# Patient Record
Sex: FEMALE | Race: WHITE | NOT HISPANIC OR LATINO | Employment: OTHER | URBAN - METROPOLITAN AREA
[De-identification: names, ages, dates, MRNs, and addresses within clinical notes are randomized per-mention and may not be internally consistent; named-entity substitution may affect disease eponyms.]

---

## 2017-04-03 ENCOUNTER — ANESTHESIA (OUTPATIENT)
Dept: PERIOP | Facility: HOSPITAL | Age: 67
End: 2017-04-03

## 2017-04-03 ENCOUNTER — HOSPITAL ENCOUNTER (OUTPATIENT)
Facility: HOSPITAL | Age: 67
Discharge: HOME | End: 2017-04-03
Attending: PLASTIC SURGERY | Admitting: PLASTIC SURGERY

## 2017-04-03 ENCOUNTER — ANESTHESIA EVENT (OUTPATIENT)
Dept: PERIOP | Facility: HOSPITAL | Age: 67
End: 2017-04-03

## 2017-04-03 VITALS
BODY MASS INDEX: 23.04 KG/M2 | HEIGHT: 68 IN | RESPIRATION RATE: 16 BRPM | OXYGEN SATURATION: 94 % | WEIGHT: 152 LBS | TEMPERATURE: 97.6 F | HEART RATE: 84 BPM | SYSTOLIC BLOOD PRESSURE: 135 MMHG | DIASTOLIC BLOOD PRESSURE: 86 MMHG

## 2017-04-03 LAB
BASOPHILS # BLD AUTO: 0.02 10*3/MM3 (ref 0–0.2)
BASOPHILS NFR BLD AUTO: 0.3 % (ref 0–1)
DEPRECATED RDW RBC AUTO: 46.9 FL (ref 37–54)
EOSINOPHIL # BLD AUTO: 0.08 10*3/MM3 (ref 0.1–0.3)
EOSINOPHIL NFR BLD AUTO: 1.4 % (ref 0–3)
ERYTHROCYTE [DISTWIDTH] IN BLOOD BY AUTOMATED COUNT: 14.4 % (ref 11.3–14.5)
HCT VFR BLD AUTO: 40 % (ref 34.5–44)
HGB BLD-MCNC: 12.7 G/DL (ref 11.5–15.5)
IMM GRANULOCYTES # BLD: 0.01 10*3/MM3 (ref 0–0.03)
IMM GRANULOCYTES NFR BLD: 0.2 % (ref 0–0.6)
LYMPHOCYTES # BLD AUTO: 1.51 10*3/MM3 (ref 0.6–4.8)
LYMPHOCYTES NFR BLD AUTO: 26.2 % (ref 24–44)
MCH RBC QN AUTO: 28.4 PG (ref 27–31)
MCHC RBC AUTO-ENTMCNC: 31.8 G/DL (ref 32–36)
MCV RBC AUTO: 89.5 FL (ref 80–99)
MONOCYTES # BLD AUTO: 0.5 10*3/MM3 (ref 0–1)
MONOCYTES NFR BLD AUTO: 8.7 % (ref 0–12)
NEUTROPHILS # BLD AUTO: 3.64 10*3/MM3 (ref 1.5–8.3)
NEUTROPHILS NFR BLD AUTO: 63.2 % (ref 41–71)
PLATELET # BLD AUTO: 209 10*3/MM3 (ref 150–450)
PMV BLD AUTO: 9.6 FL (ref 6–12)
RBC # BLD AUTO: 4.47 10*6/MM3 (ref 3.89–5.14)
WBC NRBC COR # BLD: 5.76 10*3/MM3 (ref 3.5–10.8)

## 2017-04-03 PROCEDURE — 25010000002 FENTANYL CITRATE (PF) 100 MCG/2ML SOLUTION: Performed by: NURSE ANESTHETIST, CERTIFIED REGISTERED

## 2017-04-03 PROCEDURE — 85025 COMPLETE CBC W/AUTO DIFF WBC: CPT | Performed by: ANESTHESIOLOGY

## 2017-04-03 PROCEDURE — 25010000002 ONDANSETRON PER 1 MG: Performed by: NURSE ANESTHETIST, CERTIFIED REGISTERED

## 2017-04-03 PROCEDURE — C1789 PROSTHESIS, BREAST, IMP: HCPCS | Performed by: PLASTIC SURGERY

## 2017-04-03 PROCEDURE — 25010000002 LEVOFLOXACIN PER 250 MG: Performed by: PLASTIC SURGERY

## 2017-04-03 PROCEDURE — 25010000002 HYDROMORPHONE PER 4 MG: Performed by: NURSE ANESTHETIST, CERTIFIED REGISTERED

## 2017-04-03 PROCEDURE — 25010000002 NEOSTIGMINE 10 MG/10ML SOLUTION: Performed by: NURSE ANESTHETIST, CERTIFIED REGISTERED

## 2017-04-03 PROCEDURE — 25010000002 DEXAMETHASONE PER 1 MG: Performed by: NURSE ANESTHETIST, CERTIFIED REGISTERED

## 2017-04-03 PROCEDURE — 25010000002 PROPOFOL 10 MG/ML EMULSION: Performed by: NURSE ANESTHETIST, CERTIFIED REGISTERED

## 2017-04-03 PROCEDURE — 25010000002 PROPOFOL 1000 MG/ML EMULSION: Performed by: NURSE ANESTHETIST, CERTIFIED REGISTERED

## 2017-04-03 PROCEDURE — 25010000002 PROMETHAZINE PER 50 MG: Performed by: PLASTIC SURGERY

## 2017-04-03 PROCEDURE — 25010000002 MIDAZOLAM PER 1 MG: Performed by: ANESTHESIOLOGY

## 2017-04-03 DEVICE — IMPLANTABLE DEVICE: Type: IMPLANTABLE DEVICE | Status: FUNCTIONAL

## 2017-04-03 RX ORDER — PROMETHAZINE HYDROCHLORIDE 25 MG/ML
12.5 INJECTION, SOLUTION INTRAMUSCULAR; INTRAVENOUS EVERY 6 HOURS PRN
Status: DISCONTINUED | OUTPATIENT
Start: 2017-04-03 | End: 2017-04-03 | Stop reason: HOSPADM

## 2017-04-03 RX ORDER — FENTANYL CITRATE 50 UG/ML
INJECTION, SOLUTION INTRAMUSCULAR; INTRAVENOUS AS NEEDED
Status: DISCONTINUED | OUTPATIENT
Start: 2017-04-03 | End: 2017-04-03 | Stop reason: SURG

## 2017-04-03 RX ORDER — HYDROCODONE BITARTRATE AND ACETAMINOPHEN 10; 325 MG/1; MG/1
1 TABLET ORAL EVERY 6 HOURS PRN
Status: DISCONTINUED | OUTPATIENT
Start: 2017-04-03 | End: 2017-04-03 | Stop reason: HOSPADM

## 2017-04-03 RX ORDER — PROMETHAZINE HYDROCHLORIDE 25 MG/ML
12.5 INJECTION, SOLUTION INTRAMUSCULAR; INTRAVENOUS ONCE AS NEEDED
Status: DISCONTINUED | OUTPATIENT
Start: 2017-04-03 | End: 2017-04-03 | Stop reason: HOSPADM

## 2017-04-03 RX ORDER — PROPOFOL 10 MG/ML
VIAL (ML) INTRAVENOUS AS NEEDED
Status: DISCONTINUED | OUTPATIENT
Start: 2017-04-03 | End: 2017-04-03 | Stop reason: SURG

## 2017-04-03 RX ORDER — LIDOCAINE HYDROCHLORIDE 10 MG/ML
INJECTION, SOLUTION EPIDURAL; INFILTRATION; INTRACAUDAL; PERINEURAL AS NEEDED
Status: DISCONTINUED | OUTPATIENT
Start: 2017-04-03 | End: 2017-04-03 | Stop reason: SURG

## 2017-04-03 RX ORDER — MAGNESIUM HYDROXIDE 1200 MG/15ML
LIQUID ORAL AS NEEDED
Status: DISCONTINUED | OUTPATIENT
Start: 2017-04-03 | End: 2017-04-03 | Stop reason: HOSPADM

## 2017-04-03 RX ORDER — GLYCOPYRROLATE 0.2 MG/ML
INJECTION INTRAMUSCULAR; INTRAVENOUS AS NEEDED
Status: DISCONTINUED | OUTPATIENT
Start: 2017-04-03 | End: 2017-04-03 | Stop reason: SURG

## 2017-04-03 RX ORDER — NEOSTIGMINE METHYLSULFATE 1 MG/ML
INJECTION, SOLUTION INTRAVENOUS AS NEEDED
Status: DISCONTINUED | OUTPATIENT
Start: 2017-04-03 | End: 2017-04-03 | Stop reason: SURG

## 2017-04-03 RX ORDER — DEXAMETHASONE SODIUM PHOSPHATE 10 MG/ML
INJECTION INTRAMUSCULAR; INTRAVENOUS AS NEEDED
Status: DISCONTINUED | OUTPATIENT
Start: 2017-04-03 | End: 2017-04-03 | Stop reason: SURG

## 2017-04-03 RX ORDER — SODIUM CHLORIDE 0.9 % (FLUSH) 0.9 %
1-10 SYRINGE (ML) INJECTION AS NEEDED
Status: DISCONTINUED | OUTPATIENT
Start: 2017-04-03 | End: 2017-04-03 | Stop reason: HOSPADM

## 2017-04-03 RX ORDER — LIDOCAINE HYDROCHLORIDE AND EPINEPHRINE 10; 10 MG/ML; UG/ML
INJECTION, SOLUTION INFILTRATION; PERINEURAL AS NEEDED
Status: DISCONTINUED | OUTPATIENT
Start: 2017-04-03 | End: 2017-04-03 | Stop reason: HOSPADM

## 2017-04-03 RX ORDER — LIDOCAINE HYDROCHLORIDE 10 MG/ML
0.5 INJECTION, SOLUTION EPIDURAL; INFILTRATION; INTRACAUDAL; PERINEURAL ONCE AS NEEDED
Status: COMPLETED | OUTPATIENT
Start: 2017-04-03 | End: 2017-04-03

## 2017-04-03 RX ORDER — FENTANYL CITRATE 50 UG/ML
50 INJECTION, SOLUTION INTRAMUSCULAR; INTRAVENOUS
Status: DISCONTINUED | OUTPATIENT
Start: 2017-04-03 | End: 2017-04-03 | Stop reason: HOSPADM

## 2017-04-03 RX ORDER — ROCURONIUM BROMIDE 10 MG/ML
INJECTION, SOLUTION INTRAVENOUS AS NEEDED
Status: DISCONTINUED | OUTPATIENT
Start: 2017-04-03 | End: 2017-04-03 | Stop reason: SURG

## 2017-04-03 RX ORDER — PROMETHAZINE HYDROCHLORIDE 25 MG/1
25 TABLET ORAL ONCE AS NEEDED
Status: DISCONTINUED | OUTPATIENT
Start: 2017-04-03 | End: 2017-04-03 | Stop reason: HOSPADM

## 2017-04-03 RX ORDER — FAMOTIDINE 20 MG/1
20 TABLET, FILM COATED ORAL
Status: DISCONTINUED | OUTPATIENT
Start: 2017-04-03 | End: 2017-04-03 | Stop reason: HOSPADM

## 2017-04-03 RX ORDER — PROMETHAZINE HYDROCHLORIDE 25 MG/1
25 SUPPOSITORY RECTAL ONCE AS NEEDED
Status: DISCONTINUED | OUTPATIENT
Start: 2017-04-03 | End: 2017-04-03 | Stop reason: HOSPADM

## 2017-04-03 RX ORDER — LEVOFLOXACIN 5 MG/ML
500 INJECTION, SOLUTION INTRAVENOUS ONCE
Status: COMPLETED | OUTPATIENT
Start: 2017-04-03 | End: 2017-04-03

## 2017-04-03 RX ORDER — ONDANSETRON 2 MG/ML
INJECTION INTRAMUSCULAR; INTRAVENOUS AS NEEDED
Status: DISCONTINUED | OUTPATIENT
Start: 2017-04-03 | End: 2017-04-03 | Stop reason: SURG

## 2017-04-03 RX ORDER — PROMETHAZINE HYDROCHLORIDE 12.5 MG/1
12.5 TABLET ORAL EVERY 6 HOURS PRN
Status: DISCONTINUED | OUTPATIENT
Start: 2017-04-03 | End: 2017-04-03 | Stop reason: HOSPADM

## 2017-04-03 RX ORDER — MIDAZOLAM HYDROCHLORIDE 1 MG/ML
2 INJECTION INTRAMUSCULAR; INTRAVENOUS ONCE
Status: COMPLETED | OUTPATIENT
Start: 2017-04-03 | End: 2017-04-03

## 2017-04-03 RX ORDER — FAMOTIDINE 10 MG/ML
20 INJECTION, SOLUTION INTRAVENOUS
Status: DISCONTINUED | OUTPATIENT
Start: 2017-04-03 | End: 2017-04-03

## 2017-04-03 RX ORDER — SODIUM CHLORIDE, SODIUM LACTATE, POTASSIUM CHLORIDE, CALCIUM CHLORIDE 600; 310; 30; 20 MG/100ML; MG/100ML; MG/100ML; MG/100ML
9 INJECTION, SOLUTION INTRAVENOUS CONTINUOUS PRN
Status: DISCONTINUED | OUTPATIENT
Start: 2017-04-03 | End: 2017-04-03 | Stop reason: HOSPADM

## 2017-04-03 RX ORDER — ONDANSETRON 2 MG/ML
4 INJECTION INTRAMUSCULAR; INTRAVENOUS ONCE AS NEEDED
Status: COMPLETED | OUTPATIENT
Start: 2017-04-03 | End: 2017-04-03

## 2017-04-03 RX ORDER — HYDROMORPHONE HYDROCHLORIDE 1 MG/ML
0.5 INJECTION, SOLUTION INTRAMUSCULAR; INTRAVENOUS; SUBCUTANEOUS
Status: DISCONTINUED | OUTPATIENT
Start: 2017-04-03 | End: 2017-04-03 | Stop reason: HOSPADM

## 2017-04-03 RX ADMIN — FAMOTIDINE 20 MG: 20 TABLET, FILM COATED ORAL at 08:35

## 2017-04-03 RX ADMIN — LEVOFLOXACIN 500 MG: 5 INJECTION, SOLUTION INTRAVENOUS at 10:10

## 2017-04-03 RX ADMIN — HYDROMORPHONE HYDROCHLORIDE 0.5 MG: 1 INJECTION, SOLUTION INTRAMUSCULAR; INTRAVENOUS; SUBCUTANEOUS at 12:26

## 2017-04-03 RX ADMIN — ONDANSETRON 4 MG: 2 INJECTION INTRAMUSCULAR; INTRAVENOUS at 11:03

## 2017-04-03 RX ADMIN — FENTANYL CITRATE 50 MCG: 50 INJECTION, SOLUTION INTRAMUSCULAR; INTRAVENOUS at 10:13

## 2017-04-03 RX ADMIN — MIDAZOLAM HYDROCHLORIDE 2 MG: 1 INJECTION, SOLUTION INTRAMUSCULAR; INTRAVENOUS at 09:02

## 2017-04-03 RX ADMIN — LIDOCAINE HYDROCHLORIDE 0.5 ML: 10 INJECTION, SOLUTION EPIDURAL; INFILTRATION; INTRACAUDAL; PERINEURAL at 08:39

## 2017-04-03 RX ADMIN — FENTANYL CITRATE 50 MCG: 50 INJECTION INTRAMUSCULAR; INTRAVENOUS at 12:33

## 2017-04-03 RX ADMIN — GLYCOPYRROLATE 0.4 MG: 0.2 INJECTION, SOLUTION INTRAMUSCULAR; INTRAVENOUS at 11:03

## 2017-04-03 RX ADMIN — PROPOFOL 200 MG: 10 INJECTION, EMULSION INTRAVENOUS at 10:13

## 2017-04-03 RX ADMIN — DEXAMETHASONE SODIUM PHOSPHATE 8 MG: 10 INJECTION INTRAMUSCULAR; INTRAVENOUS at 10:20

## 2017-04-03 RX ADMIN — PROPOFOL 25 MCG/KG/MIN: 10 INJECTION, EMULSION INTRAVENOUS at 10:13

## 2017-04-03 RX ADMIN — SODIUM CHLORIDE, POTASSIUM CHLORIDE, SODIUM LACTATE AND CALCIUM CHLORIDE 9 ML/HR: 600; 310; 30; 20 INJECTION, SOLUTION INTRAVENOUS at 08:40

## 2017-04-03 RX ADMIN — NEOSTIGMINE METHYLSULFATE 2.5 MG: 1 INJECTION, SOLUTION INTRAVENOUS at 11:03

## 2017-04-03 RX ADMIN — MUPIROCIN: 20 OINTMENT TOPICAL at 08:57

## 2017-04-03 RX ADMIN — PROMETHAZINE HYDROCHLORIDE 12.5 MG: 25 INJECTION, SOLUTION INTRAMUSCULAR; INTRAVENOUS at 15:09

## 2017-04-03 RX ADMIN — FENTANYL CITRATE 50 MCG: 50 INJECTION, SOLUTION INTRAMUSCULAR; INTRAVENOUS at 10:42

## 2017-04-03 RX ADMIN — ONDANSETRON 4 MG: 2 INJECTION INTRAMUSCULAR; INTRAVENOUS at 12:23

## 2017-04-03 RX ADMIN — LIDOCAINE HYDROCHLORIDE 50 MG: 10 INJECTION, SOLUTION EPIDURAL; INFILTRATION; INTRACAUDAL; PERINEURAL at 10:13

## 2017-04-03 RX ADMIN — ROCURONIUM BROMIDE 30 MG: 10 INJECTION INTRAVENOUS at 10:13

## 2017-04-03 NOTE — ANESTHESIA POSTPROCEDURE EVALUATION
Patient: Michelle Toure    Procedure Summary     Date Anesthesia Start Anesthesia Stop Room / Location    04/03/17 1010  [facility]       Procedure Diagnosis Surgeon Provider    PLACEMENT OF BREAST IMPLANT LEFT BREAST (Left Breast) No diagnosis on file. MD Rachid Mckeon MD          Anesthesia Type: general  Last vitals  BP  151/83   Temp   98.4   Pulse  85   Resp   12   SpO2   97     Post Anesthesia Care and Evaluation    Patient location during evaluation: PACU  Patient participation: complete - patient participated  Level of consciousness: awake and alert  Pain score: 0  Pain management: adequate  Airway patency: patent  Anesthetic complications: No anesthetic complications  PONV Status: none  Cardiovascular status: hemodynamically stable and acceptable  Respiratory status: nonlabored ventilation, acceptable and nasal cannula  Hydration status: acceptable

## 2017-04-03 NOTE — ANESTHESIA PROCEDURE NOTES
Airway  Urgency: elective    Airway not difficult    General Information and Staff    Patient location during procedure: OR  CRNA: ANNETTE SOTELO    Indications and Patient Condition  Indications for airway management: airway protection    Preoxygenated: yes  MILS not maintained throughout  Mask difficulty assessment: 1 - vent by mask    Final Airway Details  Final airway type: endotracheal airway      Successful airway: ETT  Cuffed: yes   Successful intubation technique: direct laryngoscopy  Endotracheal tube insertion site: oral  Blade: Lucinda  Blade size: #3  ETT size: 7.0 mm  Cormack-Lehane Classification: grade I - full view of glottis  Placement verified by: chest auscultation and capnometry   Measured from: lips  ETT to lips (cm): 20  Number of attempts at approach: 1    Additional Comments  Negative epigastric sounds, Breath sound equal bilaterally with symmetric chest rise and fall

## 2017-04-03 NOTE — OP NOTE
DATE OF PROCEDURE:  04/03/2017    SURGEON: Latonia Resendez MD     ASSISTANT: Seferino Paul PA-C     ANESTHESIA: General LMA.     PREOPERATIVE DIAGNOSES:  1. Breast cancer.   2. Status post bilateral mastectomies and multiple reconstructive procedures.   3. Recent rupture of left breast implant with explantation.     POSTOPERATIVE DIAGNOSES:  1. Breast cancer.   2. Status post bilateral mastectomies and multiple reconstructive procedures.   3. Recent rupture of left breast implant with explantation.     PROCEDURES PERFORMED: Left capsulotomy and placement of breast implant.     HISTORY OF PRESENT ILLNESS: The patient is a 66-year-old female who is several years status post bilateral mastectomies and staged reconstructive procedures. She underwent a procedure several months ago in which the implants were replaced due to capsular contractures and malposition of implants. She had done well, but then noticed a gradual folding of the implant below her incision. This was inspected in my office where the implant was found to be curiously ruptured with a 3 cm semicircular clean opening along the posterior aspect of the implant against the chest wall. The capsule was relatively undisturbed at that point in time. The implant and contents were removed at that time and sent to the company for inspection. The wound was closed, and she was scheduled to have the implant reinserted today.     DESCRIPTION OF PROCEDURE: The patient was taken to the operating room and placed on the table in the supine position. She underwent induction of general anesthesia which was maintained with an LMA throughout the procedure. Both breasts were prepped and draped in the usual sterile fashion per the old mastectomy scar, was marked and infiltrated with 0.5% lidocaine with epinephrine 1:20,000 with approximately 10 mL of the agent were used. The 4 cm incision utilized last week was reopened and extended laterally along the lines of the old  mastectomy scar for a distance of approximately 2 cm, making an approximately 6 cm opening. There was no residual free silicone detected. However, there was also no evidence of any diffusion of the silicone into the periprosthetic capsule. The pocket was irrigated copiously with bacitracin solution. The electrocautery was used to extend the capsule in all directions, particularly medially and superiorly. It was opened inferiorly and laterally for a very small distance. Hemostasis was achieved with electrocautery. Additional irrigation was carried out. An Allergan Natrelle textured silicone implant with a style 110 with a volume of 420 mL was then placed in the pocket without difficulty. The breasts were inspected and appeared symmetrical. This incision was then closed in 4 layers, with 2 capsular layers of 3-0 Monocryl, subcutaneous 3-0 Monocryl, and subcuticular 4-0 Monocryl. The area was cleansed and dressed with Skin Affix as well as Xeroform, and plain gauze. She was placed in a postsurgical compression garment. She was awakened, the LMA was removed and she was transferred to recovery in stable condition.     There were no complications. There was minimal blood loss. Sponge and needle counts were correct x2. There was no tissue sent to pathology. There were no drains placed.      MD CHUY Henderson/cr  DD: 04/03/2017 12:04:25  DT: 04/03/2017 15:09:44  Voice Rec. ID #[id number]  Voice Original ID #[id number]  Doc ID #[id number]  Rev. #0  cc:

## 2017-04-03 NOTE — H&P
Pre-Op H&P    Chief complaint: Breast cancer    HPI:    Patient is a 66 y.o.female presents with breast cancer and here today for placement of left breast silicone implant     Review of Systems:  General ROS: negative for chills, fever or skin lesions;  No changes since last office visit  Cardiovascular ROS: no chest pain or dyspnea on exertion  Respiratory ROS: no cough, shortness of breath, or wheezing    Allergies:   Allergies   Allergen Reactions   • Adriamycin [Doxorubicin] Palpitations     CAUSED TACHYCARDIA   • Amoxicillin Other (See Comments)     PT CAN'T REMEMBER   • Other Rash     CLAVULANATE POTASSIUM       Home Meds    Prescriptions Prior to Admission   Medication Sig Dispense Refill Last Dose   • acetaminophen (TYLENOL) 500 MG tablet Take 500 mg by mouth Every 6 (Six) Hours As Needed for mild pain (1-3) (1-2 TABS).   Past Month at Unknown time       PMH:   Past Medical History:   Diagnosis Date   • Arthritis    • Breast cancer    • PONV (postoperative nausea and vomiting)    • Wears glasses      PSH:    Past Surgical History:   Procedure Laterality Date   • BREAST CAPSULOTOMY, IMPLANT REVISION Bilateral 11/10/2016    Procedure: REVISION KELI BREAST RECONSTRUCTION WITH RIGHT CAPSULOTOMY, KELI IMPLANT EXCHANGE AND FAT GRAFTING ;  Surgeon: Latonia Resendez MD;  Location:  [facility] OR;  Service:    • BREAST LUMPECTOMY Right    • COLONOSCOPY     • COSMETIC SURGERY      B BREAST IMPANTS IN 1979, REMOVED AND REPLACED IN 2009   • FAT GRAFTING Bilateral 11/10/2016    Procedure: FAT GRAFTING;  Surgeon: Latonia Resendez MD;  Location:  [facility] OR;  Service:    • HYSTERECTOMY     • LASIK Bilateral    • PORTACATH PLACEMENT      REMOVED    • SKIN BIOPSY     • TOOTH EXTRACTION Right 2.5 weeks ago   • VENOUS ACCESS DEVICE (PORT) REMOVAL  2011       Immunization History:  Influenza: no  Pneumococcal: yes < 5 years  Tetanus: no    Social History:   Tobacco:   History   Smoking Status   • Never Smoker   Smokeless Tobacco   • Never  "Used      Alcohol:   History   Alcohol Use   • Yes     Comment: 1-2  TIMES A MONTH HAS WINE       Physical Exam:BP (!) 152/102 (BP Location: Left arm, Patient Position: Lying) Comment (BP Location): no bps or sticks on the right side  Pulse 82  Temp 97.9 °F (36.6 °C) (Tympanic)   Resp 16  Ht 68\" (172.7 cm)  Wt 152 lb (68.9 kg)  SpO2 98%  Breastfeeding? No  BMI 23.11 kg/m2    General Appearance:    Alert, cooperative, no distress, appears stated age   Head:    Normocephalic, without obvious abnormality, atraumatic   Lungs:     Clear to auscultation bilaterally, respirations unlabored    Heart:   Regular rate and rhythm, S1 and S2 normal, no murmur, rub    or gallop    Abdomen:    Soft, non-tender.  +bowel sounds   Breast Exam:    deferred   Genitalia:    deferred   Extremities:   Extremities normal, atraumatic, no cyanosis or edema   Skin:   Skin color, texture, turgor normal, no rashes or lesions   Neurologic:   Grossly intact   Results Review  I reviewed the patient's new clinical results.    Cancer Staging (if applicable)  Cancer Patient: __ yes __no __unknown; If yes, clinical stage T:__ N:__M:__, stage group or __N/A    Impression: Breast cancer    Plan: Placement of left breast silicone implant    Jacey Avila, FIDELIA 4/3/2017 9:09 AM  "

## 2017-04-03 NOTE — BRIEF OP NOTE
BREAST IMPLANT REVISION AND/OR REMOVAL  Procedure Note    Michelle Toure  4/3/2017    Pre-op Diagnosis:   Status post left mastectomy  Post-op Diagnosis:     Same    Procedure/CPT® Codes:      Procedure(s):  PLACEMENT OF BREAST IMPLANT LEFT BREAST    Surgeon(s):  Latonia Resendez MD    Anesthesia: General    Staff:   Circulator: Ariela Rolon RN  Scrub Person: Tez Francisco  Nursing Assistant: Vero Frey  Assistant: Seferino Paul PA-C    Estimated Blood Loss: negligible  Urine Voided: * No values recorded between 4/3/2017 10:10 AM and 4/3/2017 11:06 AM *    Specimens:                * No specimens in log *      Drains:   None       Findings: Absent left breast    Complications: none      Latonia Resendez MD     Date: 4/3/2017  Time: 11:06 AM

## 2017-06-14 ENCOUNTER — TRANSCRIBE ORDERS (OUTPATIENT)
Dept: ADMINISTRATIVE | Facility: HOSPITAL | Age: 67
End: 2017-06-14

## 2017-06-15 ENCOUNTER — TRANSCRIBE ORDERS (OUTPATIENT)
Dept: ADMINISTRATIVE | Facility: HOSPITAL | Age: 67
End: 2017-06-15

## 2017-06-15 DIAGNOSIS — T85.43XA BREAST IMPLANT RUPTURE, INITIAL ENCOUNTER: Primary | ICD-10-CM

## 2017-06-20 ENCOUNTER — HOSPITAL ENCOUNTER (OUTPATIENT)
Dept: MAMMOGRAPHY | Facility: HOSPITAL | Age: 67
Discharge: HOME | End: 2017-06-20
Attending: PLASTIC SURGERY | Admitting: PLASTIC SURGERY

## 2017-06-20 DIAGNOSIS — T85.43XA BREAST IMPLANT RUPTURE, INITIAL ENCOUNTER: ICD-10-CM

## 2017-06-20 PROCEDURE — G0204 DX MAMMO INCL CAD BI: HCPCS | Performed by: RADIOLOGY

## 2017-06-20 PROCEDURE — G0279 TOMOSYNTHESIS, MAMMO: HCPCS | Performed by: RADIOLOGY

## 2017-06-20 PROCEDURE — G0279 TOMOSYNTHESIS, MAMMO: HCPCS

## 2017-06-20 PROCEDURE — G0204 DX MAMMO INCL CAD BI: HCPCS

## 2017-11-08 ENCOUNTER — TRANSCRIBE ORDERS (OUTPATIENT)
Dept: ADMINISTRATIVE | Facility: HOSPITAL | Age: 67
End: 2017-11-08

## 2017-11-08 DIAGNOSIS — R92.8 ABNORMAL MAMMOGRAPHY: Primary | ICD-10-CM

## 2017-12-26 ENCOUNTER — HOSPITAL ENCOUNTER (OUTPATIENT)
Dept: ULTRASOUND IMAGING | Facility: HOSPITAL | Age: 67
Discharge: HOME | End: 2017-12-26

## 2017-12-26 ENCOUNTER — TRANSCRIBE ORDERS (OUTPATIENT)
Dept: MAMMOGRAPHY | Facility: HOSPITAL | Age: 67
End: 2017-12-26

## 2017-12-26 ENCOUNTER — APPOINTMENT (OUTPATIENT)
Dept: OTHER | Facility: HOSPITAL | Age: 67
End: 2017-12-26

## 2017-12-26 ENCOUNTER — HOSPITAL ENCOUNTER (OUTPATIENT)
Dept: MAMMOGRAPHY | Facility: HOSPITAL | Age: 67
Discharge: HOME | End: 2017-12-26
Admitting: INTERNAL MEDICINE

## 2017-12-26 DIAGNOSIS — R92.8 ABNORMAL MAMMOGRAPHY: ICD-10-CM

## 2017-12-26 DIAGNOSIS — R92.8 ABNORMAL MAMMOGRAM: Primary | ICD-10-CM

## 2017-12-26 PROCEDURE — G0279 TOMOSYNTHESIS, MAMMO: HCPCS | Performed by: RADIOLOGY

## 2017-12-26 PROCEDURE — G0204 DX MAMMO INCL CAD BI: HCPCS | Performed by: RADIOLOGY

## 2017-12-26 PROCEDURE — 76642 ULTRASOUND BREAST LIMITED: CPT | Performed by: RADIOLOGY

## 2017-12-26 PROCEDURE — 76642 ULTRASOUND BREAST LIMITED: CPT

## 2017-12-26 PROCEDURE — G0279 TOMOSYNTHESIS, MAMMO: HCPCS

## 2017-12-26 PROCEDURE — G0204 DX MAMMO INCL CAD BI: HCPCS

## 2018-06-28 ENCOUNTER — HOSPITAL ENCOUNTER (OUTPATIENT)
Dept: MAMMOGRAPHY | Facility: HOSPITAL | Age: 68
Discharge: HOME | End: 2018-06-28
Admitting: INTERNAL MEDICINE

## 2018-06-28 ENCOUNTER — TRANSCRIBE ORDERS (OUTPATIENT)
Dept: MAMMOGRAPHY | Facility: HOSPITAL | Age: 68
End: 2018-06-28

## 2018-06-28 DIAGNOSIS — R92.8 ABNORMAL MAMMOGRAM: ICD-10-CM

## 2018-06-28 DIAGNOSIS — R92.8 ABNORMAL MAMMOGRAM: Primary | ICD-10-CM

## 2018-06-28 PROCEDURE — 77066 DX MAMMO INCL CAD BI: CPT | Performed by: RADIOLOGY

## 2018-06-28 PROCEDURE — G0279 TOMOSYNTHESIS, MAMMO: HCPCS | Performed by: RADIOLOGY

## 2018-06-28 PROCEDURE — 77066 DX MAMMO INCL CAD BI: CPT

## 2018-06-28 PROCEDURE — G0279 TOMOSYNTHESIS, MAMMO: HCPCS

## 2018-12-31 ENCOUNTER — HOSPITAL ENCOUNTER (OUTPATIENT)
Dept: MAMMOGRAPHY | Facility: HOSPITAL | Age: 68
Discharge: HOME | End: 2018-12-31
Admitting: INTERNAL MEDICINE

## 2018-12-31 DIAGNOSIS — R92.8 ABNORMAL MAMMOGRAM: ICD-10-CM

## 2018-12-31 PROCEDURE — 77066 DX MAMMO INCL CAD BI: CPT

## 2018-12-31 PROCEDURE — 77066 DX MAMMO INCL CAD BI: CPT | Performed by: RADIOLOGY

## 2019-09-09 ENCOUNTER — OFFICE VISIT (OUTPATIENT)
Dept: CARDIOLOGY | Facility: CLINIC | Age: 69
End: 2019-09-09

## 2019-09-09 ENCOUNTER — LAB (OUTPATIENT)
Dept: LAB | Facility: HOSPITAL | Age: 69
End: 2019-09-09

## 2019-09-09 VITALS
HEIGHT: 68 IN | BODY MASS INDEX: 24.25 KG/M2 | HEART RATE: 73 BPM | SYSTOLIC BLOOD PRESSURE: 160 MMHG | WEIGHT: 160 LBS | OXYGEN SATURATION: 100 % | DIASTOLIC BLOOD PRESSURE: 100 MMHG

## 2019-09-09 DIAGNOSIS — Z00.00 HEALTHCARE MAINTENANCE: ICD-10-CM

## 2019-09-09 DIAGNOSIS — R06.02 SHORTNESS OF BREATH: ICD-10-CM

## 2019-09-09 DIAGNOSIS — R07.89 OTHER CHEST PAIN: ICD-10-CM

## 2019-09-09 DIAGNOSIS — R00.2 PALPITATIONS: ICD-10-CM

## 2019-09-09 DIAGNOSIS — R60.9 PERIPHERAL EDEMA: ICD-10-CM

## 2019-09-09 DIAGNOSIS — R07.89 OTHER CHEST PAIN: Primary | ICD-10-CM

## 2019-09-09 PROBLEM — E55.9 VITAMIN D DEFICIENCY: Status: ACTIVE | Noted: 2019-09-09

## 2019-09-09 PROBLEM — R60.0 PERIPHERAL EDEMA: Status: ACTIVE | Noted: 2019-09-09

## 2019-09-09 PROBLEM — E53.8 VITAMIN B12 DEFICIENCY: Status: ACTIVE | Noted: 2019-09-09

## 2019-09-09 LAB
CHOLEST SERPL-MCNC: 193 MG/DL (ref 0–200)
HDLC SERPL-MCNC: 70 MG/DL (ref 40–60)
LDLC SERPL CALC-MCNC: 101 MG/DL (ref 0–100)
LDLC/HDLC SERPL: 1.45 {RATIO}
MAGNESIUM SERPL-MCNC: 2.4 MG/DL (ref 1.6–2.4)
TRIGL SERPL-MCNC: 109 MG/DL (ref 0–150)
TSH SERPL DL<=0.05 MIU/L-ACNC: 1.64 UIU/ML (ref 0.27–4.2)
VLDLC SERPL-MCNC: 21.8 MG/DL

## 2019-09-09 PROCEDURE — 36415 COLL VENOUS BLD VENIPUNCTURE: CPT

## 2019-09-09 PROCEDURE — 83735 ASSAY OF MAGNESIUM: CPT | Performed by: NURSE PRACTITIONER

## 2019-09-09 PROCEDURE — 99204 OFFICE O/P NEW MOD 45 MIN: CPT | Performed by: NURSE PRACTITIONER

## 2019-09-09 PROCEDURE — 84443 ASSAY THYROID STIM HORMONE: CPT | Performed by: NURSE PRACTITIONER

## 2019-09-09 PROCEDURE — 80061 LIPID PANEL: CPT | Performed by: NURSE PRACTITIONER

## 2019-09-09 PROCEDURE — 93000 ELECTROCARDIOGRAM COMPLETE: CPT | Performed by: NURSE PRACTITIONER

## 2019-09-09 RX ORDER — LANOLIN ALCOHOL/MO/W.PET/CERES
1000 CREAM (GRAM) TOPICAL 3 TIMES WEEKLY
COMMUNITY

## 2019-09-09 RX ORDER — ASPIRIN 81 MG/1
81 TABLET ORAL DAILY
COMMUNITY
End: 2020-06-02

## 2019-09-09 NOTE — PROGRESS NOTES
Subjective   Michelle Toure is a 69 y.o. female     Chief Complaint   Patient presents with   • Establish Care   • Palpitations       HPI    Problem list:    1.  Chest pain  2.  Palpitations  3.  Shortness of breath  4.  Right breast cancer  with 4 chemo is an 33 radiations in the lumpectomy    Patient is a 69-year-old female who presents today to establish care.  She said in May and  she had a lot of what she describes as midsternal chest tightness/heaviness that did not radiate.  She says she noticed it mostly whenever she was under a lot of stress.  She says she was short of breath whenever it happened.  She says she is noticed more palpitations and fluttering also when she is anxious.  She says this does occur with the chest pain.  She said years ago she had palpitations which felt like her heart was skipping a beat and she would cough and it was subtle.  She says she has not had anything like that.  She denies any dizziness, presyncope, syncope, orthopnea or PND.  She says she has some swelling in her right lower extremity that does not always go down but is in her knee area.  She says that she typically does not have shortness of breath unless she is having a episode of chest discomfort.  She says she is been fatigued for quite some time.  She says she is never slept well since her  passed away from blood cancer 5 and half years ago.    Occupation: Housewife   She denies smoking however she will have an occasional beer, Brandie or wine; no illicit drugs  She rarely drinks a Pepsi or root beer; no coffee; rare glass of iced tea    Mom 82 -car accident, permanent pacemaker  Dad 51 -brain tumor  Sister 64 -blood clot/stroke  Brother 51 -diabetes, PVD, blind  Sister 61 alive-Parkinson's      Current Outpatient Medications   Medication Sig Dispense Refill   • aspirin 81 MG EC tablet Take 81 mg by mouth Daily. Pt takes occasionally     • Cholecalciferol (D3-1000)  1000 units capsule Take 1,000 Units by mouth Daily.     • Multiple Vitamins-Minerals (ICAPS AREDS FORMULA PO) Take  by mouth.     • vitamin B-12 (CYANOCOBALAMIN) 1000 MCG tablet Take 1,000 mcg by mouth Daily.       No current facility-administered medications for this visit.        ALLERGIES    Adriamycin [doxorubicin]; Amoxicillin; and Other    Past Medical History:   Diagnosis Date   • Arthritis    • Breast cancer (CMS/HCC)     right, with radiation and chemo. 2010   • Cataract    • DDD (degenerative disc disease), lumbar    • Dermatitis    • Drug therapy    • HTN (hypertension)    • Hx of radiation therapy 2011   • Macular degeneration    • PONV (postoperative nausea and vomiting)    • Vitamin B deficiency    • Vitamin D deficiency    • Wears glasses        Social History     Socioeconomic History   • Marital status:      Spouse name: Not on file   • Number of children: Not on file   • Years of education: Not on file   • Highest education level: Not on file   Tobacco Use   • Smoking status: Never Smoker   • Smokeless tobacco: Never Used   Substance and Sexual Activity   • Alcohol use: Yes     Comment: social wine/beer drinker, maybe 1 drink every month or so    • Drug use: No   • Sexual activity: Defer       Family History   Problem Relation Age of Onset   • Heart failure Mother    • Brain cancer Father    • Stroke Sister    • Diabetes Brother    • Parkinsonism Sister    • Breast cancer Neg Hx    • Ovarian cancer Neg Hx    • Endometrial cancer Neg Hx        Review of Systems   Constitutional: Positive for fatigue (For a long time, don't sleep well since  passed 5 1/2 yrs ago ). Negative for diaphoresis.   HENT: Positive for hearing loss. Negative for congestion, rhinorrhea and sore throat.    Eyes: Positive for visual disturbance (reading glasses and distance glasses PRN ).   Respiratory: Positive for chest tightness (In May/June, had a lot of increased stress, which results in chest tightness.  "Hasn't had any since then. ) and shortness of breath (w/ the chest tightness in May. None since then; with CP ).    Cardiovascular: Positive for chest pain (In May when she was under increased stress, she had chest discomfort. Denies any since then; midsternum tightness/heaviness; no rad; typically just when she stresses out ), palpitations (Notices plaps/flutters more when she gets anxious; with CP; years ago use to have where it felt like it was skipping, would cough and resolve, that does not happen much now ) and leg swelling (RLE edema, doesn't go down overnight ).   Gastrointestinal: Negative for abdominal pain, blood in stool, constipation, diarrhea, nausea and vomiting.   Endocrine: Negative for cold intolerance and heat intolerance.   Genitourinary: Positive for frequency. Negative for difficulty urinating, dysuria, hematuria and urgency.   Musculoskeletal: Positive for back pain (muscule aches in back, has DDD). Negative for arthralgias and neck pain.   Skin: Negative for rash and wound.   Allergic/Immunologic: Negative for environmental allergies and food allergies.   Neurological: Positive for numbness (neuropathy in toes from right foot ). Negative for dizziness, syncope, weakness, light-headedness and headaches.   Hematological: Bruises/bleeds easily (bruises).   Psychiatric/Behavioral: The patient is nervous/anxious (Most sx related to anxiety).        Objective   /100   Pulse 73   Ht 172.7 cm (68\")   Wt 72.6 kg (160 lb)   SpO2 100%   BMI 24.33 kg/m²   Vitals:    09/09/19 0915   BP: 160/100   Pulse: 73   SpO2: 100%   Weight: 72.6 kg (160 lb)   Height: 172.7 cm (68\")      Lab Results (most recent)     None        Physical Exam   Constitutional: She is oriented to person, place, and time. Vital signs are normal. She appears well-developed and well-nourished. She is active and cooperative.   HENT:   Head: Normocephalic.   Eyes: Lids are normal.   Neck: Normal carotid pulses, no hepatojugular " reflux and no JVD present. Carotid bruit is not present.   Cardiovascular: Normal rate, regular rhythm and normal heart sounds.   Pulses:       Radial pulses are 2+ on the right side, and 2+ on the left side.        Dorsalis pedis pulses are 2+ on the right side, and 2+ on the left side.        Posterior tibial pulses are 2+ on the right side, and 2+ on the left side.   No edema BLE.    Pulmonary/Chest: Effort normal and breath sounds normal.   Abdominal: Normal appearance and bowel sounds are normal.   Neurological: She is alert and oriented to person, place, and time.   Skin: Skin is warm, dry and intact.   Psychiatric: She has a normal mood and affect. Her speech is normal and behavior is normal. Judgment and thought content normal. Cognition and memory are normal.       Procedure     ECG 12 Lead  Date/Time: 9/9/2019 9:39 AM  Performed by: Ashlie Dos Santos APRN  Authorized by: Ashlie Dos Santos APRN   Comparison: not compared with previous ECG   Rhythm: sinus rhythm  Rate: normal  BPM: 68  QRS axis: normal    Clinical impression: normal ECG                 Assessment/Plan      Diagnosis Plan   1. Other chest pain  ECG 12 Lead    Stress Test With Myocardial Perfusion One Day    Adult Transthoracic Echo Complete W/ Cont if Necessary Per Protocol    Lipid Panel   2. Palpitations  ECG 12 Lead    Stress Test With Myocardial Perfusion One Day    Adult Transthoracic Echo Complete W/ Cont if Necessary Per Protocol    Cardiac Event Monitor    TSH    Magnesium   3. Shortness of breath  Stress Test With Myocardial Perfusion One Day    Adult Transthoracic Echo Complete W/ Cont if Necessary Per Protocol   4. Peripheral edema  Stress Test With Myocardial Perfusion One Day    Adult Transthoracic Echo Complete W/ Cont if Necessary Per Protocol   5. Healthcare maintenance  TSH    Magnesium    Lipid Panel       Return in about 12 weeks (around 12/2/2019).       Chest pain/palpitations/shortness of breath/peripheral edema-patient  have ischemia work-up, stress and echo.  She will wear an event monitor for 2 weeks.  She will get a TSH, magnesium and lipid.  She will continue her medication regimen for now including her aspirin.  Since she has had no episodes since June patient did not feel necessary to receive any nitro.  She will continue her medication regimen for now.  She will follow-up in 12 weeks or sooner if any changes.  I did educate patient on nitro use and advised if she has any further issues to call our office and we will send some in.      Patient did bring blood pressure readings from home and her blood pressures typically very good.  Her lowest was 115 systolic with her highest being 134.     Patient's Body mass index is 24.33 kg/m². BMI is within normal parameters. No follow-up required..      Electronically signed by:

## 2019-09-09 NOTE — PATIENT INSTRUCTIONS
Palpitations  Palpitations are feelings that your heartbeat is irregular or is faster than normal. It may feel like your heart is fluttering or skipping a beat. Palpitations are usually not a serious problem. They may be caused by many things, including smoking, caffeine, alcohol, stress, and certain medicines or drugs. Most causes of palpitations are not serious. However, some palpitations can be a sign of a serious problem. You may need further tests to rule out serious medical problems.  Follow these instructions at home:    Pay attention to any changes in your condition. Take these actions to help manage your symptoms:  Eating and drinking  · Avoid foods and drinks that may cause palpitations. These may include:  ? Caffeinated coffee, tea, soft drinks, diet pills, and energy drinks.  ? Chocolate.  ? Alcohol.  Lifestyle  · Take steps to reduce your stress and anxiety. Things that can help you relax include:  ? Yoga.  ? Mind-body activities, such as deep breathing, meditation, or using words and images to create positive thoughts (guided imagery).  ? Physical activity, such as swimming, jogging, or walking. Tell your health care provider if your palpitations increase with activity. If you have chest pain or shortness of breath with activity, do not continue the activity until you are seen by your health care provider.  ? Biofeedback. This is a method that helps you learn to use your mind to control things in your body, such as your heartbeat.  · Do not use drugs, including cocaine or ecstasy. Do not use marijuana.  · Get plenty of rest and sleep. Keep a regular bed time.  General instructions  · Take over-the-counter and prescription medicines only as told by your health care provider.  · Do not use any products that contain nicotine or tobacco, such as cigarettes and e-cigarettes. If you need help quitting, ask your health care provider.  · Keep all follow-up visits as told by your health care provider. This is  important. These may include visits for further testing if palpitations do not go away or get worse.  Contact a health care provider if you:  · Continue to have a fast or irregular heartbeat after 24 hours.  · Notice that your palpitations occur more often.  Get help right away if you:  · Have chest pain or shortness of breath.  · Have a severe headache.  · Feel dizzy or you faint.  Summary  · Palpitations are feelings that your heartbeat is irregular or is faster than normal. It may feel like your heart is fluttering or skipping a beat.  · Palpitations may be caused by many things, including smoking, caffeine, alcohol, stress, certain medicines, and drugs.  · Although most causes of palpitations are not serious, some causes can be a sign of a serious medical problem.  · Get help right away if you faint or have chest pain, shortness of breath, a severe headache, or dizziness.  This information is not intended to replace advice given to you by your health care provider. Make sure you discuss any questions you have with your health care provider.  Document Released: 12/15/2001 Document Revised: 01/30/2019 Document Reviewed: 01/30/2019  [employer] Interactive Patient Education © 2019 [employer]  Nonspecific Chest Pain  Chest pain can be caused by many different conditions. It can be caused by something serious that needs treatment right away. This includes:  · Heart attack.  · A tear in the body's main blood vessel.  · Redness and swelling (inflammation) around your heart.  · Blood clot in your lungs.  It can be caused by something that is not as serious. This includes:  · Heartburn.  · Anxiety or stress.  · Damage to bones or muscles in your chest.  · Lung infections.  See your doctor right away if you have chest pain. This is important.  Follow these instructions at home:  Medicines  · Take over-the-counter and prescription medicines only as told by your doctor.  · If you were prescribed an antibiotic medicine, take  it as told by your doctor. Do not stop taking the antibiotic even if you start to feel better.  Lifestyle    · Rest as told by your doctor.  · Do not use any products that contain nicotine or tobacco, such as cigarettes and e-cigarettes. If you need help quitting, ask your doctor.  · Do not drink alcohol.  · Make lifestyle changes as told by your doctor. These may include:  ? Getting regular exercise. Ask your doctor what activities are safe for you.  ? Eating a heart-healthy diet. A diet and nutrition specialist (dietitian) can help you to learn healthy eating options.  ? Staying at a healthy weight.  ? Managing diabetes, if needed.  ? Lowering your stress. Activities such as yoga and relaxation techniques can help.  General instructions  · Avoid any activities that cause you to have chest pain.  · Keep all follow-up visits as told by your doctor. This is important. You may need more testing if your chest pain does not go away.  Contact a doctor if:  · Your chest pain does not go away.  · You feel depressed.  · You have a fever.  Get help right away if:  · Your chest pain is worse.  · You have a cough that gets worse, or you cough up blood.  · You have very bad (severe) pain in your belly (abdomen).  · You pass out (faint).  · You have either of these for no clear reason:  ? Sudden chest discomfort.  ? Sudden discomfort in your arms, back, neck, or jaw.  · You have shortness of breath at any time.  · You suddenly start to sweat, or your skin gets clammy.  · You feel sick to your stomach (nauseous).  · You throw up (vomit).  · You suddenly feel lightheaded or dizzy.  · You feel very weak or tired.  · Your heart starts to beat fast, or it feels like it is skipping beats.  These symptoms may be an emergency. Do not wait to see if the symptoms will go away. Get medical help right away. Call your local emergency services (911 in the [place] Do not drive yourself to the hospital.  Summary  · Chest pain can be caused by  many different conditions. The cause may be serious and need treatment right away. If you have chest pain, see your doctor right away.  · Follow your doctor's instructions for taking medicines and making lifestyle changes. Keep all follow-up visits as told by your doctor. This includes visits for any further testing if your chest pain does not go away.  · Know what signs mean you should get medical help right away.  This information is not intended to replace advice given to you by your health care provider. Make sure you discuss any questions you have with your health care provider.  Document Released: 06/05/2009 Document Revised: 02/08/2019 Document Reviewed: 02/08/2019  [employer] Interactive Patient Education © 2019 [employer]

## 2019-09-10 ENCOUNTER — TELEPHONE (OUTPATIENT)
Dept: CARDIOLOGY | Facility: CLINIC | Age: 69
End: 2019-09-10

## 2019-09-10 NOTE — TELEPHONE ENCOUNTER
----- Message from FIDELIA Breaux sent at 9/10/2019  7:46 AM EDT -----  Please advise patient of labs.  Advise bad cholesterol, LDL just one point too high.

## 2019-09-11 NOTE — TELEPHONE ENCOUNTER
Courtney Oliveros, Mariaa Byers             Patient called back this AM returning your call. Please call cell at [phone]. Thank you

## 2019-09-11 NOTE — TELEPHONE ENCOUNTER
Informed pt of her lab results, she verbalized understanding.     Pt stated she's supposed to have an echo and stress and that no one has called. I informed her to give them until Monday, then to call if she hasn't heard from anyone, she verbalized understanding.

## 2019-09-26 ENCOUNTER — HOSPITAL ENCOUNTER (OUTPATIENT)
Dept: CARDIOLOGY | Facility: HOSPITAL | Age: 69
Discharge: HOME | End: 2019-09-26

## 2019-09-26 VITALS — WEIGHT: 160.05 LBS | HEIGHT: 68 IN | BODY MASS INDEX: 24.26 KG/M2

## 2019-09-26 DIAGNOSIS — R00.2 PALPITATIONS: ICD-10-CM

## 2019-09-26 DIAGNOSIS — R60.9 PERIPHERAL EDEMA: ICD-10-CM

## 2019-09-26 DIAGNOSIS — R07.89 OTHER CHEST PAIN: ICD-10-CM

## 2019-09-26 DIAGNOSIS — R06.02 SHORTNESS OF BREATH: ICD-10-CM

## 2019-09-26 PROCEDURE — 93306 TTE W/DOPPLER COMPLETE: CPT

## 2019-09-26 PROCEDURE — A9500 TC99M SESTAMIBI: HCPCS | Performed by: INTERNAL MEDICINE

## 2019-09-26 PROCEDURE — 78452 HT MUSCLE IMAGE SPECT MULT: CPT | Performed by: INTERNAL MEDICINE

## 2019-09-26 PROCEDURE — 93306 TTE W/DOPPLER COMPLETE: CPT | Performed by: INTERNAL MEDICINE

## 2019-09-26 PROCEDURE — 0 TECHNETIUM SESTAMIBI: Performed by: INTERNAL MEDICINE

## 2019-09-26 PROCEDURE — 93018 CV STRESS TEST I&R ONLY: CPT | Performed by: INTERNAL MEDICINE

## 2019-09-26 PROCEDURE — 93017 CV STRESS TEST TRACING ONLY: CPT

## 2019-09-26 PROCEDURE — 78452 HT MUSCLE IMAGE SPECT MULT: CPT

## 2019-09-26 RX ADMIN — TECHNETIUM TC 99M SESTAMIBI 1 DOSE: 1 INJECTION INTRAVENOUS at 08:36

## 2019-09-26 RX ADMIN — TECHNETIUM TC 99M SESTAMIBI 1 DOSE: 1 INJECTION INTRAVENOUS at 10:00

## 2019-09-29 LAB
BH CV NUCLEAR PRIOR STUDY: 3
BH CV STRESS BP STAGE 1: NORMAL
BH CV STRESS BP STAGE 2: NORMAL
BH CV STRESS DURATION MIN STAGE 1: 3
BH CV STRESS DURATION MIN STAGE 2: 3
BH CV STRESS DURATION SEC STAGE 1: 0
BH CV STRESS DURATION SEC STAGE 2: 0
BH CV STRESS GRADE STAGE 1: 10
BH CV STRESS GRADE STAGE 2: 12
BH CV STRESS HR STAGE 1: 109
BH CV STRESS HR STAGE 2: 121
BH CV STRESS METS STAGE 1: 5
BH CV STRESS METS STAGE 2: 7.5
BH CV STRESS PROTOCOL 1: NORMAL
BH CV STRESS RECOVERY BP: NORMAL MMHG
BH CV STRESS RECOVERY HR: 77 BPM
BH CV STRESS SPEED STAGE 1: 1.7
BH CV STRESS SPEED STAGE 2: 2.5
BH CV STRESS STAGE 1: 1
BH CV STRESS STAGE 2: 2
MAXIMAL PREDICTED HEART RATE: 150 BPM
PERCENT MAX PREDICTED HR: 80.67 %
STRESS BASELINE BP: NORMAL MMHG
STRESS BASELINE HR: 74 BPM
STRESS PERCENT HR: 95 %
STRESS POST ESTIMATED WORKLOAD: 7 METS
STRESS POST EXERCISE DUR MIN: 6 MIN
STRESS POST EXERCISE DUR SEC: 11 SEC
STRESS POST PEAK BP: NORMAL MMHG
STRESS POST PEAK HR: 121 BPM
STRESS TARGET HR: 128 BPM

## 2019-10-01 ENCOUNTER — TELEPHONE (OUTPATIENT)
Dept: CARDIOLOGY | Facility: CLINIC | Age: 69
End: 2019-10-01

## 2019-10-01 DIAGNOSIS — I71.20 THORACIC AORTIC ANEURYSM WITHOUT RUPTURE (HCC): Primary | ICD-10-CM

## 2019-10-01 NOTE — TELEPHONE ENCOUNTER
Stress:  1.  Adequate exercise capacity without symptoms.     2.  Physiologic heart rate and blood pressure responses.     3.  No EKG evidence of ischemia.  No exercise-induced dysrhythmia.     4.  No scintigraphic evidence of ischemia.     5.  Preserved post stress ejection fraction of 74% with no focal wall motion abnormalities.     6.  No evidence of transient ischemic dilation nor of increased lung uptake of radiopharmaceutical.        Waiting on echo results...

## 2019-10-02 ENCOUNTER — TELEPHONE (OUTPATIENT)
Dept: CARDIOLOGY | Facility: CLINIC | Age: 69
End: 2019-10-02

## 2019-10-02 LAB
BH CV ECHO MEAS - ACS: 1.9 CM
BH CV ECHO MEAS - AO MAX PG (FULL): 0.19 MMHG
BH CV ECHO MEAS - AO MAX PG: 4.9 MMHG
BH CV ECHO MEAS - AO MEAN PG (FULL): 0.46 MMHG
BH CV ECHO MEAS - AO MEAN PG: 2.7 MMHG
BH CV ECHO MEAS - AO ROOT AREA (BSA CORRECTED): 2.2
BH CV ECHO MEAS - AO ROOT AREA: 12.6 CM^2
BH CV ECHO MEAS - AO ROOT DIAM: 4 CM
BH CV ECHO MEAS - AO V2 MAX: 111.1 CM/SEC
BH CV ECHO MEAS - AO V2 MEAN: 77.1 CM/SEC
BH CV ECHO MEAS - AO V2 VTI: 26.1 CM
BH CV ECHO MEAS - BSA(HAYCOCK): 1.9 M^2
BH CV ECHO MEAS - BSA: 1.9 M^2
BH CV ECHO MEAS - BZI_BMI: 24.3 KILOGRAMS/M^2
BH CV ECHO MEAS - BZI_METRIC_HEIGHT: 172.7 CM
BH CV ECHO MEAS - BZI_METRIC_WEIGHT: 72.6 KG
BH CV ECHO MEAS - CI(CUBED): 5.3 L/MIN/M^2
BH CV ECHO MEAS - CI(TEICH): 4.7 L/MIN/M^2
BH CV ECHO MEAS - CO(CUBED): 9.8 L/MIN
BH CV ECHO MEAS - CO(TEICH): 8.7 L/MIN
BH CV ECHO MEAS - EDV(CUBED): 90.8 ML
BH CV ECHO MEAS - EDV(TEICH): 92.1 ML
BH CV ECHO MEAS - EF(CUBED): 67 %
BH CV ECHO MEAS - EF(TEICH): 58.7 %
BH CV ECHO MEAS - ESV(CUBED): 29.9 ML
BH CV ECHO MEAS - ESV(TEICH): 38.1 ML
BH CV ECHO MEAS - FS: 30.9 %
BH CV ECHO MEAS - IVS/LVPW: 1.2
BH CV ECHO MEAS - IVSD: 1.2 CM
BH CV ECHO MEAS - LA DIMENSION: 2.2 CM
BH CV ECHO MEAS - LA/AO: 0.55
BH CV ECHO MEAS - LAT PEAK E' VEL: 7 CM/SEC
BH CV ECHO MEAS - LV IVRT: 0.1 SEC
BH CV ECHO MEAS - LV MASS(C)D: 166.4 GRAMS
BH CV ECHO MEAS - LV MASS(C)DI: 89.5 GRAMS/M^2
BH CV ECHO MEAS - LV MAX PG: 4.7 MMHG
BH CV ECHO MEAS - LV MEAN PG: 2.3 MMHG
BH CV ECHO MEAS - LV V1 MAX: 108.9 CM/SEC
BH CV ECHO MEAS - LV V1 MEAN: 68.9 CM/SEC
BH CV ECHO MEAS - LV V1 VTI: 25.7 CM
BH CV ECHO MEAS - LVIDD: 4.5 CM
BH CV ECHO MEAS - LVIDS: 3.1 CM
BH CV ECHO MEAS - LVPWD: 0.97 CM
BH CV ECHO MEAS - MED PEAK E' VEL: 6 CM/SEC
BH CV ECHO MEAS - MITRAL HR: 123 BPM
BH CV ECHO MEAS - MITRAL R-R: 0.49 SEC
BH CV ECHO MEAS - MM HR: 160.6 BPM
BH CV ECHO MEAS - MM R-R INT: 0.37 SEC
BH CV ECHO MEAS - MV A MAX VEL: 84.7 CM/SEC
BH CV ECHO MEAS - MV DEC SLOPE: 352.9 CM/SEC^2
BH CV ECHO MEAS - MV DEC TIME: 0.17 SEC
BH CV ECHO MEAS - MV E MAX VEL: 59.9 CM/SEC
BH CV ECHO MEAS - MV E/A: 0.71
BH CV ECHO MEAS - MV MAX PG: 2.9 MMHG
BH CV ECHO MEAS - MV MEAN PG: 1.6 MMHG
BH CV ECHO MEAS - MV V2 MAX: 84.6 CM/SEC
BH CV ECHO MEAS - MV V2 MEAN: 61.5 CM/SEC
BH CV ECHO MEAS - MV V2 VTI: 30 CM
BH CV ECHO MEAS - PA MAX PG: 3.1 MMHG
BH CV ECHO MEAS - PA MEAN PG: 1.8 MMHG
BH CV ECHO MEAS - PA V2 MAX: 88.2 CM/SEC
BH CV ECHO MEAS - PA V2 MEAN: 64.7 CM/SEC
BH CV ECHO MEAS - PA V2 VTI: 19.3 CM
BH CV ECHO MEAS - PULM. HR: 201.6 BPM
BH CV ECHO MEAS - PULM. R-R: 0.3 SEC
BH CV ECHO MEAS - RAP SYSTOLE: 10 MMHG
BH CV ECHO MEAS - RVDD: 3.1 CM
BH CV ECHO MEAS - SI(AO): 176.8 ML/M^2
BH CV ECHO MEAS - SI(CUBED): 32.7 ML/M^2
BH CV ECHO MEAS - SI(TEICH): 29.1 ML/M^2
BH CV ECHO MEAS - SV(AO): 328.6 ML
BH CV ECHO MEAS - SV(CUBED): 60.8 ML
BH CV ECHO MEAS - SV(TEICH): 54.1 ML
BH CV ECHO MEASUREMENTS AVERAGE E/E' RATIO: 9.22
MAXIMAL PREDICTED HEART RATE: 150 BPM
STRESS TARGET HR: 128 BPM

## 2019-10-02 NOTE — TELEPHONE ENCOUNTER
Spoke to patient and let her know about results. She is okay to try the Atenolol and will monitor her BP and pulse at home. She will call or come back sooner as needed, otherwise she will keep follow up as scheduled on 12/2/19.  Rx sent to [facility].  [place] SERNA    Rec'd monitor results. Pt had 0 critical, 0 serious, and 8 stable events with a heart rate of 92.5 bpm.  Per Ashlie Dos Santos, pt needs to start Atenolol 25mg 1/2 tablet qd. L/M asking pt to call back about results.  [place] SERNA

## 2019-10-03 NOTE — TELEPHONE ENCOUNTER
Ashlie Dos Santos, Mariaa Puckett             Have patient get a CTA of chest with BMP prior for dilated ascending aorta, Thoracic aortic aneurysm.        Echo:  Estimated EF appears to be in the range of 56 - 60%.  The proximal aortic root appears mildly dilated at 4 cm immediately above the sinotubular junction.

## 2019-10-08 RX ORDER — ATENOLOL 25 MG/1
TABLET ORAL
Qty: 15 TABLET | Refills: 2 | Status: SHIPPED | OUTPATIENT
Start: 2019-10-08 | End: 2020-01-07 | Stop reason: SDUPTHER

## 2019-11-04 ENCOUNTER — TELEPHONE (OUTPATIENT)
Dept: CARDIOLOGY | Facility: CLINIC | Age: 69
End: 2019-11-04

## 2019-11-04 NOTE — TELEPHONE ENCOUNTER
----- Message from FIDELIA Breaux sent at 11/4/2019  5:03 PM EST -----  Call patient and find out if she has had a chest CT done anywhere else.  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  Does she still have port or did she ever have a port?  Has she ever been told she had an occluded vein before?  If she has had other CTs of the chest please request them so that I can compare results.  Also having any cardiac symptoms or still doing well?  CT showed diffuse plaque in heart.Thanks!.  Also forward to PCP

## 2019-11-05 NOTE — TELEPHONE ENCOUNTER
Per Ashlie, find out if she had a port or picc line, if so, she's good. The report was sent to PCP.     Pt states that she had a port.   Pt states that she's not really having any cardiac issues, just palps and tightness w/ anxiety. I reminded her of appt and she verbalized understanding.

## 2019-11-05 NOTE — TELEPHONE ENCOUNTER
Courtney Oliveros, Mariaa Byers             Patient is returning your call from yesterday. -;[facility]

## 2019-12-02 ENCOUNTER — OFFICE VISIT (OUTPATIENT)
Dept: CARDIOLOGY | Facility: CLINIC | Age: 69
End: 2019-12-02

## 2019-12-02 VITALS
BODY MASS INDEX: 24.58 KG/M2 | HEIGHT: 68 IN | OXYGEN SATURATION: 99 % | HEART RATE: 64 BPM | SYSTOLIC BLOOD PRESSURE: 162 MMHG | DIASTOLIC BLOOD PRESSURE: 99 MMHG | WEIGHT: 162.2 LBS

## 2019-12-02 DIAGNOSIS — R60.9 PERIPHERAL EDEMA: ICD-10-CM

## 2019-12-02 DIAGNOSIS — I49.3 PVC (PREMATURE VENTRICULAR CONTRACTION): ICD-10-CM

## 2019-12-02 DIAGNOSIS — R06.02 SHORTNESS OF BREATH: ICD-10-CM

## 2019-12-02 DIAGNOSIS — I49.1 PREMATURE ATRIAL CONTRACTION: ICD-10-CM

## 2019-12-02 DIAGNOSIS — I10 ESSENTIAL HYPERTENSION: ICD-10-CM

## 2019-12-02 DIAGNOSIS — R00.2 PALPITATIONS: Primary | ICD-10-CM

## 2019-12-02 PROCEDURE — 99213 OFFICE O/P EST LOW 20 MIN: CPT | Performed by: NURSE PRACTITIONER

## 2019-12-02 NOTE — PROGRESS NOTES
Subjective   Michelle Toure is a 69 y.o. female     Chief Complaint   Patient presents with   • Follow-up       HPI    Problem list:    1.  Chest pain  1.1 stress test 9/26/19-no evidence of ischemia, post-rest EF 74%  2.  Palpitations  2.1 event monitor 9/12-9/25/19-run of SVT, PACs and PVCs  3.  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  4.  Right breast cancer 2010 with 4 chemo is an 33 radiations in the lumpectomy    Patient is a 69-year-old female who presents today for follow-up on testing.  She denies any chest pain, pressure, dizziness, presyncope, syncope, orthopnea, PND or edema.  She says her palpitations have been much better since she has been on the atenolol.  She brought her blood pressure readings in and they are excellent.  She denies any shortness of breath with activity.    We went over stress, echo and event.    Current Outpatient Medications on File Prior to Visit   Medication Sig Dispense Refill   • aspirin 81 MG EC tablet Take 81 mg by mouth Daily. Pt takes occasionally     • atenolol (TENORMIN) 25 MG tablet Take 1/2 tablet by mouth daily 15 tablet 2   • Calcium-Vitamin D-Vitamin K (VIACTIV PO) Take  by mouth.     • Cholecalciferol (D3-1000) 1000 units capsule Take 1,000 Units by mouth Daily.     • vitamin B-12 (CYANOCOBALAMIN) 1000 MCG tablet Take 1,000 mcg by mouth Daily.     • [DISCONTINUED] Multiple Vitamins-Minerals (ICAPS AREDS FORMULA PO) Take  by mouth.       No current facility-administered medications on file prior to visit.        ALLERGIES    Adriamycin [doxorubicin]; Amoxicillin; and Other    Past Medical  History:   Diagnosis Date   • Arthritis    • Breast cancer (CMS/HCC)     right, with radiation and chemo. 2010   • Cataract    • DDD (degenerative disc disease), lumbar    • Dermatitis    • Drug therapy    • HTN (hypertension)    • Hx of radiation therapy 2011   • Macular degeneration    • PONV (postoperative nausea and vomiting)    • Vitamin B deficiency    • Vitamin D deficiency    • Wears glasses        Social History     Socioeconomic History   • Marital status:      Spouse name: Not on file   • Number of children: Not on file   • Years of education: Not on file   • Highest education level: Not on file   Tobacco Use   • Smoking status: Never Smoker   • Smokeless tobacco: Never Used   Substance and Sexual Activity   • Alcohol use: Yes     Comment: social wine/beer drinker, maybe 1 drink every month or so    • Drug use: No   • Sexual activity: Defer       Family History   Problem Relation Age of Onset   • Heart failure Mother    • Brain cancer Father    • Stroke Sister    • Diabetes Brother    • Parkinsonism Sister    • Breast cancer Neg Hx    • Ovarian cancer Neg Hx    • Endometrial cancer Neg Hx        Review of Systems   Constitutional: Negative for diaphoresis and fatigue.   HENT: Positive for hearing loss ([place]). Negative for congestion, rhinorrhea and sore throat.    Eyes: Positive for visual disturbance (reading glasses ).   Respiratory: Negative for chest tightness and shortness of breath.    Cardiovascular: Positive for palpitations (seldom ). Negative for chest pain (Denies CP ) and leg swelling.   Gastrointestinal: Negative for abdominal pain, blood in stool, constipation, diarrhea, nausea and vomiting.   Endocrine: Negative for cold intolerance and heat intolerance.   Genitourinary: Negative for difficulty urinating, dysuria, frequency, hematuria and urgency.   Musculoskeletal: Positive for arthralgias and back pain (if doing too much ). Negative for neck pain.   Skin: Negative for rash and  "wound.   Allergic/Immunologic: Positive for environmental allergies. Negative for food allergies (seasonal ).   Neurological: Positive for numbness (under right foot from chemo ) and headaches (very seldom ). Negative for dizziness, syncope, weakness and light-headedness.   Hematological: Does not bruise/bleed easily.   Psychiatric/Behavioral: Positive for sleep disturbance (Imrpoving, states she still wakes up periodically ).       Objective   /99   Pulse 64   Ht 172.7 cm (68\")   Wt 73.6 kg (162 lb 3.2 oz)   SpO2 99%   BMI 24.66 kg/m²   Vitals:    12/02/19 1031   BP: 162/99   Pulse: 64   SpO2: 99%   Weight: 73.6 kg (162 lb 3.2 oz)   Height: 172.7 cm (68\")      Lab Results (most recent)     None        Physical Exam   Constitutional: She is oriented to person, place, and time. Vital signs are normal. She appears well-developed and well-nourished. She is active and cooperative.   HENT:   Head: Normocephalic.   Right Ear: Decreased hearing is noted.   Left Ear: Decreased hearing is noted.   Eyes: Lids are normal.   Reading glasses    Neck: Normal carotid pulses, no hepatojugular reflux and no JVD present. Carotid bruit is not present.   Cardiovascular: Normal rate, regular rhythm and normal heart sounds.   Pulses:       Radial pulses are 2+ on the right side, and 2+ on the left side.        Dorsalis pedis pulses are 2+ on the right side, and 2+ on the left side.        Posterior tibial pulses are 2+ on the right side, and 2+ on the left side.   No edema BLE.   Pulmonary/Chest: Effort normal and breath sounds normal.   Abdominal: Normal appearance and bowel sounds are normal.   Neurological: She is alert and oriented to person, place, and time.   Skin: Skin is warm, dry and intact.   Psychiatric: She has a normal mood and affect. Her speech is normal and behavior is normal. Judgment and thought content normal. Cognition and memory are normal.       Procedure   Procedures         Assessment/Plan      " Diagnosis Plan   1. Palpitations     2. Shortness of breath     3. Peripheral edema     4. PVC (premature ventricular contraction)     5. Premature atrial contraction     6. Essential hypertension         Return in about 6 months (around 6/2/2020).  Palpitation/PVCs/PACs-patient will continue her atenolol.  Shortness of breath-stable.  Peripheral edema-resolved.  Hypertension-patient brought blood pressure readings from home and on the atenolol she is doing excellent.  She will continue her medication regimen.  She will follow-up in 6 months or sooner if any changes.         Patient's Body mass index is 24.66 kg/m². BMI is within normal parameters. No follow-up required..      Electronically signed by:

## 2019-12-02 NOTE — PATIENT INSTRUCTIONS
Palpitations  Palpitations are feelings that your heartbeat is irregular or is faster than normal. It may feel like your heart is fluttering or skipping a beat. Palpitations are usually not a serious problem. They may be caused by many things, including smoking, caffeine, alcohol, stress, and certain medicines or drugs. Most causes of palpitations are not serious. However, some palpitations can be a sign of a serious problem. You may need further tests to rule out serious medical problems.  Follow these instructions at home:         Pay attention to any changes in your condition. Take these actions to help manage your symptoms:  Eating and drinking  · Avoid foods and drinks that may cause palpitations. These may include:  ? Caffeinated coffee, tea, soft drinks, diet pills, and energy drinks.  ? Chocolate.  ? Alcohol.  Lifestyle  · Take steps to reduce your stress and anxiety. Things that can help you relax include:  ? Yoga.  ? Mind-body activities, such as deep breathing, meditation, or using words and images to create positive thoughts (guided imagery).  ? Physical activity, such as swimming, jogging, or walking. Tell your health care provider if your palpitations increase with activity. If you have chest pain or shortness of breath with activity, do not continue the activity until you are seen by your health care provider.  ? Biofeedback. This is a method that helps you learn to use your mind to control things in your body, such as your heartbeat.  · Do not use drugs, including cocaine or ecstasy. Do not use marijuana.  · Get plenty of rest and sleep. Keep a regular bed time.  General instructions  · Take over-the-counter and prescription medicines only as told by your health care provider.  · Do not use any products that contain nicotine or tobacco, such as cigarettes and e-cigarettes. If you need help quitting, ask your health care provider.  · Keep all follow-up visits as told by your health care provider. This  is important. These may include visits for further testing if palpitations do not go away or get worse.  Contact a health care provider if you:  · Continue to have a fast or irregular heartbeat after 24 hours.  · Notice that your palpitations occur more often.  Get help right away if you:  · Have chest pain or shortness of breath.  · Have a severe headache.  Feel dizzy or you f  Premature Ventricular Contraction    A premature ventricular contraction (PVC) is a common kind of irregular heartbeat (arrhythmia). These contractions are extra heartbeats that start in the ventricles of the heart and occur too early in the normal sequence. During the PVC, the heart's normal electrical pathway is not used, so the beat is shorter and less effective. In most cases, these contractions come and go and do not require treatment.  What are the causes?  Common causes of the condition include:  Smoking.  Drinking alcohol.  Certain medicines.  Some illegal drugs.  Stress.  Caffeine.  Certain medical conditions can also cause PVCs:  Heart failure.  Heart attack, or coronary artery disease.  Heart valve problems.  Changes in minerals in the blood (electrolytes).  Low blood oxygen levels or high carbon dioxide levels.  In many cases, the cause of this condition is not known.  What are the signs or symptoms?  The main symptom of this condition is fast or skipped heartbeats (palpitations). Other symptoms include:  Chest pain.  Shortness of breath.  Feeling tired.  Dizziness.  Difficulty exercising.  In some cases, there are no symptoms.  How is this diagnosed?  This condition may be diagnosed based on:  Your medical history.  A physical exam. During the exam, the health care provider will check for irregular heartbeats.  Tests, such as:  An ECG (electrocardiogram) to monitor the electrical activity of your heart.  Ambulatory cardiac monitor. This device records your heartbeats for 24 hours or more.  Stress tests to see how exercise  affects your heart rhythm and blood supply.  Echocardiogram. This test uses sound waves (ultrasound) to produce an image of your heart.  Electrophysiology study (EPS). This test checks for electrical problems in your heart.  How is this treated?  Treatment for this condition depends on any underlying conditions, the type of PVCs that you are having, and how much the symptoms are interfering with your daily life.  Possible treatments include:  Avoiding things that cause premature contractions (triggers). These include caffeine and alcohol.  Taking medicines if symptoms are severe or if the extra heartbeats are frequent.  Getting treatment for underlying conditions that cause PVCs.  Having an implantable cardioverter defibrillator (ICD), if you are at risk for a serious arrhythmia. The ICD is a small device that is inserted into your chest to monitor your heartbeat. When it senses an irregular heartbeat, it sends a shock to bring the heartbeat back to normal.  Having a procedure to destroy the portion of the heart tissue that sends out abnormal signals (catheter ablation).  In some cases, no treatment is required.  Follow these instructions at home:  Alcohol use    Do not drink alcohol if:  Your health care provider tells you not to drink.  You are pregnant, may be pregnant, or are planning to become pregnant.  Alcohol triggers your episodes.  If you drink alcohol, limit how much you have. You may drink:  0-1 drink a day for women.  0-2 drinks a day for men.  Be aware of how much alcohol is in your drink. In the [place] one drink equals one typical bottle of beer (12 oz), one-half glass of wine (5 oz), or one shot of hard liquor (1½ oz).  General instructions  Do not use any products that contain nicotine or tobacco, such as cigarettes and e-cigarettes. If you need help quitting, ask your health care provider.  Find healthy ways to manage stress. Avoid stressful situations when possible.  Exercise regularly. Ask your  health care provider what type of exercise is safe for you.  Try to get at least 7-9 hours of sleep each night, or as much as recommended by your health care provider.  If caffeine triggers episodes of PVC, do not eat, drink, or use anything with caffeine in it.  Do not use illegal drugs.  Take over-the-counter and prescription medicines only as told by your health care provider.  Keep all follow-up visits as told by your health care provider. This is important.  Contact a health care provider if you:  Feel palpitations.  Get help right away if you:  Have chest pain.  Have shortness of breath.  Have sweating for no reason.  Have nausea and vomiting.  Become light-headed or you faint.  Summary  A premature ventricular contraction (PVC) is a common kind of irregular heartbeat (arrhythmia).  In most cases, these contractions come and go and do not require treatment.  You may need to wear an ambulatory cardiac monitor. This records your heartbeats for 24 hours or more.  Treatment depends on any underlying conditions, the type of PVCs that you are having, and how much the symptoms are interfering with your daily life.  This information is not intended to replace advice given to you by your health care provider. Make sure you discuss any questions you have with your health care provider.  Document Released: 08/04/2005 Document Revised: 08/02/2019 Document Reviewed: 02/05/2019  [employer] Interactive Patient Education © 2019 [employer]  · shani.  Summary  · Palpitations are feelings that your heartbeat is irregular or is faster than normal. It may feel like your heart is fluttering or skipping a beat.  · Palpitations may be caused by many things, including smoking, caffeine, alcohol, stress, certain medicines, and drugs.  · Although most causes of palpitations are not serious, some causes can be a sign of a serious medical problem.  · Get help right away if you faint or have chest pain, shortness of breath, a severe  "headache, or dizziness.  This information is not intended to replace advice given to you by your health care provider. Make sure you discuss any questions you have with your health care provider.  Document Released: 12/15/2001 Document Revised: 01/30/2019 Document Reviewed: 01/30/2019  [email or web address] Interactive Patient Education © 2019 [email or web address]    Premature Atrial Contraction    A premature atrial contraction (PAC) is a kind of irregular heartbeat (arrhythmia). It happens when the heart beats too early and then pauses before beating again.  The heart has four areas, or chambers. Normally, electrical signals spread across the heart and make all the chambers beat together. During a PAC, the upper chambers of the heart (atria) beat too early, before they have had time to fill with blood. The heartbeat pauses afterward so the heart can fill with blood for the next beat.  Sometimes PAC can be a warning sign of another type of arrhythmia called atrial fibrillation. Atrial fibrillation may allow blood to pool in the atria and form clots. If a clot travels to the brain, it can cause a stroke.  What are the causes?  The cause of this condition is often unknown. Sometimes it is caused by heart disease or injury to the heart.  What increases the risk?  This condition is more likely to develop in children and in adults who are 50 years of age or older. Episodes may be triggered by:  · Caffeine.  · Alcohol.  · Tobacco use.  · Stimulant drugs.  · Some medicines or supplements.  · Stress.  · Heart disease.  What are the signs or symptoms?  Symptoms of this condition include:  · A feeling that your heart skipped a beat. The first heartbeat after the \"skipped\" beat may feel more forceful.  · A feeling that your heart is fluttering.  How is this diagnosed?  This condition is diagnosed based on:  · Your symptoms.  · A physical exam. Your health care provider may listen to your heart.  · Electrocardiogram (ECG). This is a test that " records the electrical impulses of the heart.  · Ambulatory cardiac monitor. This device records your heartbeats for 24 hours or more.  You may also have:  · An echocardiogram to check for any heart conditions. This is a type of imaging test that uses sound waves (ultrasound) to make images of your heart.  · Blood tests.  How is this treated?  Treatment depends on the frequency of your symptoms and other risk factors. Treatments may include:  · Medicines (beta-blockers).  · Catheter ablation. This is done to destroy the part of the heart tissue that sends abnormal signals.  In some cases, treatment may not be needed for this condition.  Follow these instructions at home:  Alcohol use  · Do not drink alcohol if:  ? Your health care provider tells you not to drink.  ? You are pregnant, may be pregnant, or are planning to become pregnant.  ? Alcohol triggers your episodes.  · If you drink alcohol, limit how much you have. You may drink:  ? 0-1 drink a day for women.  ? 0-2 drinks a day for men.  · Be aware of how much alcohol is in your drink. In the [place] one drink equals one typical bottle of beer (12 oz), one-half glass of wine (5 oz), or one shot of hard liquor (1½ oz).  General instructions  · Do not use any products that contain nicotine or tobacco, such as cigarettes and e-cigarettes. If you need help quitting, ask your health care provider.  · If caffeine triggers episodes, do not eat, drink, or use anything with caffeine in it.  · Exercise regularly. Ask your health care provider what type of exercise is safe for you.  · Find healthy ways to manage stress.  · Try to get at least 7-9 hours of sleep each night, or as much as recommended by your health care provider.  · Take over-the-counter and prescription medicines only as told by your health care provider.  · Keep all follow-up visits as told by your health care provider. This is important.  Contact a health care provider if:  · You feel your heart skipping  "beats.  · Your heart skips beats and you feel dizzy, light-headed, or very tired.  Get help right away if you have:  · Chest pain.  · Trouble breathing.  · Any symptoms of stroke. \"BE FAST\" is an easy way to remember the main warning signs of a stroke.  ? B - Balance. Signs are dizziness, sudden trouble walking, or loss of balance.  ? E - Eyes. Signs are trouble seeing or a sudden change in vision.  ? F - Face. Signs are sudden weakness or numbness of the face, or the face or eyelid drooping on one side.  ? A - Arms. Signs are weakness or numbness in an arm. This happens suddenly and usually on one side of the body.  ? S - Speech. Signs are sudden trouble speaking, slurred speech, or trouble understanding what people say.  ? T - Time. Time to call emergency services. Write down what time symptoms started.  · Other signs of stroke, such as:  ? A sudden, severe headache with no known cause.  ? Nausea or vomiting.  ? Seizure.  These symptoms may represent a serious problem that is an emergency. Do not wait to see if the symptoms will go away. Get medical help right away. Call your local emergency services (911 in the [place] Do not drive yourself to the hospital.  Summary  · A premature atrial contraction (PAC) is a kind of irregular heartbeat (arrhythmia). It happens when the heart beats too early and then pauses before beating again.  · Treatment depends on your symptoms and whether you have other underlying heart conditions.  · Contact a health care provider if your heart skips beats and you feel dizzy, light-headed, or very tired.  · In some cases, this condition may lead to a stroke. \"BE FAST\" is an easy way to remember the warning signs of stroke. Get help right away if you have any of the \"BE FAST\" signs.  This information is not intended to replace advice given to you by your health care provider. Make sure you discuss any questions you have with your health care provider.  Document Released: 08/21/2015 Document " Revised: 08/02/2019 Document Reviewed: 02/05/2019  [employer] Interactive Patient Education © 2019 [employer]

## 2019-12-09 ENCOUNTER — TRANSCRIBE ORDERS (OUTPATIENT)
Dept: ADMINISTRATIVE | Facility: HOSPITAL | Age: 69
End: 2019-12-09

## 2019-12-09 DIAGNOSIS — Z12.31 VISIT FOR SCREENING MAMMOGRAM: Primary | ICD-10-CM

## 2020-01-07 RX ORDER — ATENOLOL 25 MG/1
TABLET ORAL
Qty: 15 TABLET | Refills: 2 | Status: SHIPPED | OUTPATIENT
Start: 2020-01-07 | End: 2020-03-30

## 2020-03-18 ENCOUNTER — HOSPITAL ENCOUNTER (OUTPATIENT)
Dept: MAMMOGRAPHY | Facility: HOSPITAL | Age: 70
Discharge: HOME | End: 2020-03-18
Admitting: INTERNAL MEDICINE

## 2020-03-18 DIAGNOSIS — Z12.31 VISIT FOR SCREENING MAMMOGRAM: ICD-10-CM

## 2020-03-18 PROCEDURE — 77067 SCR MAMMO BI INCL CAD: CPT | Performed by: RADIOLOGY

## 2020-03-18 PROCEDURE — 77063 BREAST TOMOSYNTHESIS BI: CPT | Performed by: RADIOLOGY

## 2020-03-18 PROCEDURE — 77063 BREAST TOMOSYNTHESIS BI: CPT

## 2020-03-18 PROCEDURE — 77067 SCR MAMMO BI INCL CAD: CPT

## 2020-03-30 RX ORDER — ATENOLOL 25 MG/1
TABLET ORAL
Qty: 45 TABLET | Refills: 0 | Status: SHIPPED | OUTPATIENT
Start: 2020-03-30 | End: 2020-07-13

## 2020-05-04 ENCOUNTER — TELEPHONE (OUTPATIENT)
Dept: CARDIOLOGY | Facility: CLINIC | Age: 70
End: 2020-05-04

## 2020-05-04 NOTE — TELEPHONE ENCOUNTER
Cardiac clearance req was reviewed in office by FIDELIA Bermeo and faxed back. -;[facility]

## 2020-06-02 ENCOUNTER — OFFICE VISIT (OUTPATIENT)
Dept: CARDIOLOGY | Facility: CLINIC | Age: 70
End: 2020-06-02

## 2020-06-02 VITALS
TEMPERATURE: 97.3 F | BODY MASS INDEX: 23.52 KG/M2 | HEART RATE: 84 BPM | WEIGHT: 155.2 LBS | SYSTOLIC BLOOD PRESSURE: 141 MMHG | OXYGEN SATURATION: 98 % | DIASTOLIC BLOOD PRESSURE: 97 MMHG | HEIGHT: 68 IN

## 2020-06-02 DIAGNOSIS — I10 ESSENTIAL HYPERTENSION: Primary | ICD-10-CM

## 2020-06-02 DIAGNOSIS — R60.9 PERIPHERAL EDEMA: ICD-10-CM

## 2020-06-02 DIAGNOSIS — I49.3 PVC (PREMATURE VENTRICULAR CONTRACTION): ICD-10-CM

## 2020-06-02 DIAGNOSIS — R06.02 SHORTNESS OF BREATH: ICD-10-CM

## 2020-06-02 DIAGNOSIS — I49.1 PREMATURE ATRIAL CONTRACTION: ICD-10-CM

## 2020-06-02 PROCEDURE — 99213 OFFICE O/P EST LOW 20 MIN: CPT | Performed by: NURSE PRACTITIONER

## 2020-06-02 NOTE — PROGRESS NOTES
Subjective   Michelle Toure is a 69 y.o. female     Chief Complaint   Patient presents with   • Palpitations       HPI    Problem list:    1.  Chest pain  1.1 stress test 9/26/19-no evidence of ischemia, post-rest EF 74%  2.  Palpitations  2.1 event monitor 9/12-9/25/19-run of SVT, PACs and PVCs  3.  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  4.  Right breast cancer 2010 with 4 chemo is an 33 radiations in the lumpectomy    Patient is a 69-year-old female who presents today for follow-up.  Patient says she had gone to the ER after having 3 days of significant left upper quadrant pain.  She said then she had pain in her low back and bilaterally under her breast.  She says that she was found to have diverticula in the hospital and was started on morphine and antibiotics.  She has not had any further episodes since the one episode of sharp pain she had in her chest on the third day of her left upper quadrant pain.  She denies any palpitations, fluttering, dizziness, presyncope, syncope, orthopnea, PND or edema.  She denies any shortness of breath with activity.  She says she is doing excellent on the atenolol and her blood pressures are staying beautiful.    Current Outpatient Medications on File Prior to Visit   Medication Sig Dispense Refill   • atenolol (TENORMIN) 25 MG tablet Take 1/2 (one-half) tablet by mouth once daily 45 tablet 0   • Cholecalciferol (D3-1000) 1000 units capsule Take 1,000 Units by mouth Daily.     • vitamin B-12 (CYANOCOBALAMIN) 1000 MCG tablet Take 1,000 mcg by mouth 3 (Three) Times a Week.     • [DISCONTINUED] aspirin 81  MG EC tablet Take 81 mg by mouth Daily. Pt takes occasionally     • [DISCONTINUED] Calcium-Vitamin D-Vitamin K (VIACTIV PO) Take  by mouth.       No current facility-administered medications on file prior to visit.        ALLERGIES    Adriamycin [doxorubicin]; Amoxicillin; and Other    Past Medical History:   Diagnosis Date   • Arthritis    • Breast cancer (CMS/HCC)     right, with radiation and chemo. 2010   • Cataract    • DDD (degenerative disc disease), lumbar    • Dermatitis    • Diverticulitis    • Drug therapy    • HTN (hypertension)    • Hx of radiation therapy 2011   • Macular degeneration    • PONV (postoperative nausea and vomiting)    • Vitamin B deficiency    • Vitamin D deficiency    • Wears glasses        Social History     Socioeconomic History   • Marital status:      Spouse name: Not on file   • Number of children: Not on file   • Years of education: Not on file   • Highest education level: Not on file   Tobacco Use   • Smoking status: Never Smoker   • Smokeless tobacco: Never Used   Substance and Sexual Activity   • Alcohol use: Yes     Comment: social wine/beer drinker, maybe 1 drink every month or so    • Drug use: No   • Sexual activity: Defer       Family History   Problem Relation Age of Onset   • Heart failure Mother    • Brain cancer Father    • Stroke Sister    • Diabetes Brother    • Parkinsonism Sister    • Breast cancer Neg Hx    • Ovarian cancer Neg Hx    • Endometrial cancer Neg Hx        Review of Systems   Constitutional: Negative for fatigue and fever.   HENT: Positive for hearing loss ([place]). Negative for congestion, rhinorrhea and sneezing.    Eyes: Positive for visual disturbance (wears glasses PRN).   Respiratory: Negative for cough, chest tightness, shortness of breath and wheezing.    Cardiovascular: Positive for chest pain (sharp pain behind chest wall bilaterally, on 3rd day of having LUQ pain, diagnosed with Diverticula; none since having ABX). Negative for  "palpitations and leg swelling.   Gastrointestinal: Negative for abdominal pain, blood in stool, constipation, diarrhea, nausea and vomiting.   Endocrine: Negative for cold intolerance and heat intolerance.   Genitourinary: Positive for frequency (urinary frequency). Negative for difficulty urinating, hematuria and urgency.   Musculoskeletal: Positive for arthralgias (joints) and back pain (low back pain with diverticula ). Negative for neck pain and neck stiffness.   Skin: Negative for rash and wound.   Allergic/Immunologic: Negative for environmental allergies and food allergies.   Neurological: Negative for dizziness, syncope, weakness, light-headedness and numbness.   Hematological: Bruises/bleeds easily (bruises easily).   Psychiatric/Behavioral: Negative for agitation, confusion and sleep disturbance (denies waking up smothering/SOA). The patient is nervous/anxious (easily nervous).        Objective   /97 (BP Location: Left arm, Patient Position: Sitting)   Pulse 84   Temp 97.3 °F (36.3 °C)   Ht 172.7 cm (68\")   Wt 70.4 kg (155 lb 3.2 oz)   SpO2 98%   BMI 23.60 kg/m²   Vitals:    06/02/20 1107   BP: 141/97   BP Location: Left arm   Patient Position: Sitting   Pulse: 84   Temp: 97.3 °F (36.3 °C)   SpO2: 98%   Weight: 70.4 kg (155 lb 3.2 oz)   Height: 172.7 cm (68\")      Lab Results (most recent)     None        Physical Exam   Constitutional: She is oriented to person, place, and time. Vital signs are normal. She appears well-developed and well-nourished. She is active and cooperative.   HENT:   Head: Normocephalic.   Eyes: Lids are normal.   Neck: Normal carotid pulses, no hepatojugular reflux and no JVD present. Carotid bruit is not present.   Cardiovascular: Normal rate, regular rhythm and normal heart sounds.   Pulses:       Radial pulses are 2+ on the right side, and 2+ on the left side.        Dorsalis pedis pulses are 2+ on the right side, and 2+ on the left side.        Posterior tibial " pulses are 2+ on the right side, and 2+ on the left side.   No edema BLE.   Pulmonary/Chest: Effort normal and breath sounds normal.   Abdominal: Normal appearance and bowel sounds are normal.   Neurological: She is alert and oriented to person, place, and time.   Skin: Skin is warm, dry and intact.   Psychiatric: She has a normal mood and affect. Her speech is normal and behavior is normal. Judgment and thought content normal. Cognition and memory are normal.       Procedure   Procedures         Assessment/Plan      Diagnosis Plan   1. Essential hypertension     2. Premature atrial contraction     3. PVC (premature ventricular contraction)     4. Shortness of breath     5. Peripheral edema         Return in about 6 months (around 12/2/2020).  Hypertension-patient's doing very well on atenolol.  PACs/PVCs-patient denies any palpitations on atenolol.  Shortness of breath-resolved.  Peripheral edema-resolved.  She will continue her medication regimen.  She will follow-up in 6 months or sooner if any changes.  I did advise patient should she have any further episodes of chest discomfort contact our office and we will get her in for a follow-up as well as any nitroglycerin at that time.     Advance Care Planning   ACP discussion was held with the patient during this visit. Patient does not have an advance directive, information provided.     Patient's Body mass index is 23.6 kg/m². BMI is within normal parameters. No follow-up required..      Electronically signed by:

## 2020-06-02 NOTE — PATIENT INSTRUCTIONS
Advance Directive    Advance directives are legal documents that let you make choices ahead of time about your health care and medical treatment in case you become unable to communicate for yourself. Advance directives are a way for you to communicate your wishes to family, friends, and health care providers. This can help convey your decisions about end-of-life care if you become unable to communicate.  Discussing and writing advance directives should happen over time rather than all at once. Advance directives can be changed depending on your situation and what you want, even after you have signed the advance directives.  If you do not have an advance directive, some states assign family decision makers to act on your behalf based on how closely you are related to them. Each state has its own laws regarding advance directives. You may want to check with your health care provider, , or state representative about the laws in your state. There are different types of advance directives, such as:  · Medical power of .  · Living will.  · Do not resuscitate (DNR) or do not attempt resuscitation (DNAR) order.  Health care proxy and medical power of   A health care proxy, also called a health care agent, is a person who is appointed to make medical decisions for you in cases in which you are unable to make the decisions yourself. Generally, people choose someone they know well and trust to represent their preferences. Make sure to ask this person for an agreement to act as your proxy. A proxy may have to exercise judgment in the event of a medical decision for which your wishes are not known.  A medical power of  is a legal document that names your health care proxy. Depending on the laws in your state, after the document is written, it may also need to be:  · Signed.  · Notarized.  · Dated.  · Copied.  · Witnessed.  · Incorporated into your medical record.  You may also want to appoint  someone to manage your financial affairs in a situation in which you are unable to do so. This is called a durable power of  for finances. It is a separate legal document from the durable power of  for health care. You may choose the same person or someone different from your health care proxy to act as your agent in financial matters.  If you do not appoint a proxy, or if there is a concern that the proxy is not acting in your best interests, a court-appointed guardian may be designated to act on your behalf.  Living will  A living will is a set of instructions documenting your wishes about medical care when you cannot express them yourself. Health care providers should keep a copy of your living will in your medical record. You may want to give a copy to family members or friends. To alert caregivers in case of an emergency, you can place a card in your wallet to let them know that you have a living will and where they can find it. A living will is used if you become:  · Terminally ill.  · Incapacitated.  · Unable to communicate or make decisions.  Items to consider in your living will include:  · The use or non-use of life-sustaining equipment, such as dialysis machines and breathing machines (ventilators).  · A DNR or DNAR order, which is the instruction not to use cardiopulmonary resuscitation (CPR) if breathing or heartbeat stops.  · The use or non-use of tube feeding.  · Withholding of food and fluids.  · Comfort (palliative) care when the goal becomes comfort rather than a cure.  · Organ and tissue donation.  A living will does not give instructions for distributing your money and property if you should pass away. It is recommended that you seek the advice of a  when writing a will. Decisions about taxes, beneficiaries, and asset distribution will be legally binding. This process can relieve your family and friends of any concerns surrounding disputes or questions that may come up about  the distribution of your assets.  DNR or DNAR  A DNR or DNAR order is a request not to have CPR in the event that your heart stops beating or you stop breathing. If a DNR or DNAR order has not been made and shared, a health care provider will try to help any patient whose heart has stopped or who has stopped breathing. If you plan to have surgery, talk with your health care provider about how your DNR or DNAR order will be followed if problems occur.  Summary  · Advance directives are the legal documents that allow you to make choices ahead of time about your health care and medical treatment in case you become unable to communicate for yourself.  · The process of discussing and writing advance directives should happen over time. You can change the advance directives, even after you have signed them.  · Advance directives include DNR or DNAR orders, living barnes, and designating an agent as your medical power of .  This information is not intended to replace advice given to you by your health care provider. Make sure you discuss any questions you have with your health care provider.  Document Released: 03/26/2009 Document Revised: 01/22/2020 Document Reviewed: 11/06/2017  Elsevier Patient Education © 2020 [employer]

## 2020-07-13 RX ORDER — ATENOLOL 25 MG/1
TABLET ORAL
Qty: 45 TABLET | Refills: 0 | Status: SHIPPED | OUTPATIENT
Start: 2020-07-13 | End: 2020-10-19

## 2020-10-11 ENCOUNTER — APPOINTMENT (OUTPATIENT)
Dept: PREADMISSION TESTING | Facility: HOSPITAL | Age: 70
End: 2020-10-11

## 2020-10-11 VITALS — BODY MASS INDEX: 23.59 KG/M2 | WEIGHT: 155.65 LBS | HEIGHT: 68 IN

## 2020-10-11 LAB
ANION GAP SERPL CALCULATED.3IONS-SCNC: 8 MMOL/L (ref 5–15)
BUN SERPL-MCNC: 13 MG/DL (ref 8–23)
BUN/CREAT SERPL: 16 (ref 7–25)
CALCIUM SPEC-SCNC: 9.5 MG/DL (ref 8.6–10.5)
CHLORIDE SERPL-SCNC: 104 MMOL/L (ref 98–107)
CO2 SERPL-SCNC: 30 MMOL/L (ref 22–29)
CREAT SERPL-MCNC: 0.81 MG/DL (ref 0.57–1)
DEPRECATED RDW RBC AUTO: 44.4 FL (ref 37–54)
ERYTHROCYTE [DISTWIDTH] IN BLOOD BY AUTOMATED COUNT: 13.6 % (ref 12.3–15.4)
GFR SERPL CREATININE-BSD FRML MDRD: 70 ML/MIN/1.73
GLUCOSE SERPL-MCNC: 87 MG/DL (ref 65–99)
HCT VFR BLD AUTO: 41.9 % (ref 34–46.6)
HGB BLD-MCNC: 13.1 G/DL (ref 12–15.9)
MCH RBC QN AUTO: 27.8 PG (ref 26.6–33)
MCHC RBC AUTO-ENTMCNC: 31.3 G/DL (ref 31.5–35.7)
MCV RBC AUTO: 88.8 FL (ref 79–97)
PLATELET # BLD AUTO: 254 10*3/MM3 (ref 140–450)
PMV BLD AUTO: 9.5 FL (ref 6–12)
POTASSIUM SERPL-SCNC: 4.5 MMOL/L (ref 3.5–5.2)
RBC # BLD AUTO: 4.72 10*6/MM3 (ref 3.77–5.28)
SODIUM SERPL-SCNC: 142 MMOL/L (ref 136–145)
WBC # BLD AUTO: 6.65 10*3/MM3 (ref 3.4–10.8)

## 2020-10-11 PROCEDURE — 80048 BASIC METABOLIC PNL TOTAL CA: CPT | Performed by: PLASTIC SURGERY

## 2020-10-11 PROCEDURE — 93005 ELECTROCARDIOGRAM TRACING: CPT

## 2020-10-11 PROCEDURE — 36415 COLL VENOUS BLD VENIPUNCTURE: CPT

## 2020-10-11 PROCEDURE — U0004 COV-19 TEST NON-CDC HGH THRU: HCPCS | Performed by: INTERNAL MEDICINE

## 2020-10-11 PROCEDURE — C9803 HOPD COVID-19 SPEC COLLECT: HCPCS

## 2020-10-11 PROCEDURE — 85027 COMPLETE CBC AUTOMATED: CPT | Performed by: PLASTIC SURGERY

## 2020-10-11 PROCEDURE — 93010 ELECTROCARDIOGRAM REPORT: CPT | Performed by: INTERNAL MEDICINE

## 2020-10-11 NOTE — PAT
Patient to apply Chlorhexadine wipes  to surgical area (as instructed) the night before procedure and the AM of procedure. Wipes provided.    Patient instructed to drink 20 ounces (or until full) of Gatorade and it needs to be completed 1 hour before given arrival time for procedure (NO RED Gatorade)    Patient verbalized understanding.    Pt had Covid test today, just prior to PAT.

## 2020-10-12 LAB — SARS-COV-2 RNA RESP QL NAA+PROBE: NOT DETECTED

## 2020-10-13 ENCOUNTER — ANESTHESIA (OUTPATIENT)
Dept: PERIOP | Facility: HOSPITAL | Age: 70
End: 2020-10-13

## 2020-10-13 ENCOUNTER — ANESTHESIA EVENT (OUTPATIENT)
Dept: PERIOP | Facility: HOSPITAL | Age: 70
End: 2020-10-13

## 2020-10-13 ENCOUNTER — HOSPITAL ENCOUNTER (OUTPATIENT)
Facility: HOSPITAL | Age: 70
Setting detail: HOSPITAL OUTPATIENT SURGERY
Discharge: HOME | End: 2020-10-13
Attending: PLASTIC SURGERY | Admitting: PLASTIC SURGERY

## 2020-10-13 VITALS
HEIGHT: 68 IN | HEART RATE: 106 BPM | DIASTOLIC BLOOD PRESSURE: 102 MMHG | SYSTOLIC BLOOD PRESSURE: 161 MMHG | OXYGEN SATURATION: 100 % | BODY MASS INDEX: 23.59 KG/M2 | TEMPERATURE: 97.8 F | RESPIRATION RATE: 18 BRPM | WEIGHT: 155.64 LBS

## 2020-10-13 PROCEDURE — 25010000002 ONDANSETRON PER 1 MG: Performed by: NURSE ANESTHETIST, CERTIFIED REGISTERED

## 2020-10-13 PROCEDURE — 25010000002 FENTANYL CITRATE (PF) 100 MCG/2ML SOLUTION: Performed by: NURSE ANESTHETIST, CERTIFIED REGISTERED

## 2020-10-13 PROCEDURE — 25010000003 CEFAZOLIN IN DEXTROSE 2-4 GM/100ML-% SOLUTION: Performed by: PLASTIC SURGERY

## 2020-10-13 PROCEDURE — 25010000002 PROPOFOL 10 MG/ML EMULSION: Performed by: NURSE ANESTHETIST, CERTIFIED REGISTERED

## 2020-10-13 PROCEDURE — 25010000002 DEXAMETHASONE PER 1 MG: Performed by: NURSE ANESTHETIST, CERTIFIED REGISTERED

## 2020-10-13 RX ORDER — CEFAZOLIN SODIUM 2 G/100ML
2 INJECTION, SOLUTION INTRAVENOUS ONCE
Status: COMPLETED | OUTPATIENT
Start: 2020-10-13 | End: 2020-10-13

## 2020-10-13 RX ORDER — LIDOCAINE HYDROCHLORIDE 10 MG/ML
0.5 INJECTION, SOLUTION EPIDURAL; INFILTRATION; INTRACAUDAL; PERINEURAL ONCE AS NEEDED
Status: COMPLETED | OUTPATIENT
Start: 2020-10-13 | End: 2020-10-13

## 2020-10-13 RX ORDER — SODIUM CHLORIDE 0.9 % (FLUSH) 0.9 %
10 SYRINGE (ML) INJECTION AS NEEDED
Status: DISCONTINUED | OUTPATIENT
Start: 2020-10-13 | End: 2020-10-13 | Stop reason: HOSPADM

## 2020-10-13 RX ORDER — CEPHALEXIN 500 MG/1
500 CAPSULE ORAL 3 TIMES DAILY
Qty: 15 CAPSULE | Refills: 0 | Status: SHIPPED | OUTPATIENT
Start: 2020-10-13 | End: 2020-10-18

## 2020-10-13 RX ORDER — LIDOCAINE HYDROCHLORIDE 10 MG/ML
INJECTION, SOLUTION EPIDURAL; INFILTRATION; INTRACAUDAL; PERINEURAL AS NEEDED
Status: DISCONTINUED | OUTPATIENT
Start: 2020-10-13 | End: 2020-10-13 | Stop reason: SURG

## 2020-10-13 RX ORDER — HYDROMORPHONE HYDROCHLORIDE 1 MG/ML
0.5 INJECTION, SOLUTION INTRAMUSCULAR; INTRAVENOUS; SUBCUTANEOUS
Status: DISCONTINUED | OUTPATIENT
Start: 2020-10-13 | End: 2020-10-13 | Stop reason: HOSPADM

## 2020-10-13 RX ORDER — SODIUM CHLORIDE, SODIUM LACTATE, POTASSIUM CHLORIDE, CALCIUM CHLORIDE 600; 310; 30; 20 MG/100ML; MG/100ML; MG/100ML; MG/100ML
9 INJECTION, SOLUTION INTRAVENOUS CONTINUOUS
Status: DISCONTINUED | OUTPATIENT
Start: 2020-10-13 | End: 2020-10-13 | Stop reason: HOSPADM

## 2020-10-13 RX ORDER — GLYCOPYRROLATE 0.2 MG/ML
INJECTION INTRAMUSCULAR; INTRAVENOUS AS NEEDED
Status: DISCONTINUED | OUTPATIENT
Start: 2020-10-13 | End: 2020-10-13 | Stop reason: SURG

## 2020-10-13 RX ORDER — FAMOTIDINE 20 MG/1
20 TABLET, FILM COATED ORAL ONCE
Status: COMPLETED | OUTPATIENT
Start: 2020-10-13 | End: 2020-10-13

## 2020-10-13 RX ORDER — MAGNESIUM HYDROXIDE 1200 MG/15ML
LIQUID ORAL AS NEEDED
Status: DISCONTINUED | OUTPATIENT
Start: 2020-10-13 | End: 2020-10-13 | Stop reason: HOSPADM

## 2020-10-13 RX ORDER — FAMOTIDINE 10 MG/ML
20 INJECTION, SOLUTION INTRAVENOUS ONCE
Status: CANCELLED | OUTPATIENT
Start: 2020-10-13 | End: 2020-10-13

## 2020-10-13 RX ORDER — SODIUM CHLORIDE 0.9 % (FLUSH) 0.9 %
10 SYRINGE (ML) INJECTION EVERY 12 HOURS SCHEDULED
Status: DISCONTINUED | OUTPATIENT
Start: 2020-10-13 | End: 2020-10-13 | Stop reason: HOSPADM

## 2020-10-13 RX ORDER — PROPOFOL 10 MG/ML
VIAL (ML) INTRAVENOUS AS NEEDED
Status: DISCONTINUED | OUTPATIENT
Start: 2020-10-13 | End: 2020-10-13 | Stop reason: SURG

## 2020-10-13 RX ORDER — ONDANSETRON 2 MG/ML
4 INJECTION INTRAMUSCULAR; INTRAVENOUS ONCE AS NEEDED
Status: DISCONTINUED | OUTPATIENT
Start: 2020-10-13 | End: 2020-10-13 | Stop reason: HOSPADM

## 2020-10-13 RX ORDER — FENTANYL CITRATE 50 UG/ML
INJECTION, SOLUTION INTRAMUSCULAR; INTRAVENOUS AS NEEDED
Status: DISCONTINUED | OUTPATIENT
Start: 2020-10-13 | End: 2020-10-13 | Stop reason: SURG

## 2020-10-13 RX ORDER — DEXAMETHASONE SODIUM PHOSPHATE 4 MG/ML
INJECTION, SOLUTION INTRA-ARTICULAR; INTRALESIONAL; INTRAMUSCULAR; INTRAVENOUS; SOFT TISSUE AS NEEDED
Status: DISCONTINUED | OUTPATIENT
Start: 2020-10-13 | End: 2020-10-13 | Stop reason: SURG

## 2020-10-13 RX ORDER — FENTANYL CITRATE 50 UG/ML
50 INJECTION, SOLUTION INTRAMUSCULAR; INTRAVENOUS
Status: DISCONTINUED | OUTPATIENT
Start: 2020-10-13 | End: 2020-10-13 | Stop reason: HOSPADM

## 2020-10-13 RX ORDER — ONDANSETRON 2 MG/ML
INJECTION INTRAMUSCULAR; INTRAVENOUS AS NEEDED
Status: DISCONTINUED | OUTPATIENT
Start: 2020-10-13 | End: 2020-10-13 | Stop reason: SURG

## 2020-10-13 RX ADMIN — LIDOCAINE HYDROCHLORIDE 50 MG: 10 INJECTION, SOLUTION EPIDURAL; INFILTRATION; INTRACAUDAL; PERINEURAL at 13:14

## 2020-10-13 RX ADMIN — EPHEDRINE SULFATE 5 MG: 50 INJECTION INTRAMUSCULAR; INTRAVENOUS; SUBCUTANEOUS at 13:40

## 2020-10-13 RX ADMIN — FENTANYL CITRATE 50 MCG: 50 INJECTION, SOLUTION INTRAMUSCULAR; INTRAVENOUS at 13:52

## 2020-10-13 RX ADMIN — PROPOFOL 200 MG: 10 INJECTION, EMULSION INTRAVENOUS at 13:14

## 2020-10-13 RX ADMIN — FENTANYL CITRATE 50 MCG: 50 INJECTION, SOLUTION INTRAMUSCULAR; INTRAVENOUS at 13:14

## 2020-10-13 RX ADMIN — CEFAZOLIN SODIUM 2 G: 2 INJECTION, SOLUTION INTRAVENOUS at 13:11

## 2020-10-13 RX ADMIN — LIDOCAINE HYDROCHLORIDE 0.5 ML: 10 INJECTION, SOLUTION EPIDURAL; INFILTRATION; INTRACAUDAL; PERINEURAL at 12:13

## 2020-10-13 RX ADMIN — EPHEDRINE SULFATE 15 MG: 50 INJECTION INTRAMUSCULAR; INTRAVENOUS; SUBCUTANEOUS at 13:38

## 2020-10-13 RX ADMIN — FAMOTIDINE 20 MG: 20 TABLET, FILM COATED ORAL at 12:13

## 2020-10-13 RX ADMIN — EPHEDRINE SULFATE 10 MG: 50 INJECTION INTRAMUSCULAR; INTRAVENOUS; SUBCUTANEOUS at 13:29

## 2020-10-13 RX ADMIN — ONDANSETRON 4 MG: 2 INJECTION INTRAMUSCULAR; INTRAVENOUS at 13:14

## 2020-10-13 RX ADMIN — DEXAMETHASONE SODIUM PHOSPHATE 4 MG: 4 INJECTION, SOLUTION INTRAMUSCULAR; INTRAVENOUS at 13:14

## 2020-10-13 RX ADMIN — GLYCOPYRROLATE 0.2 MG: 0.2 INJECTION INTRAMUSCULAR; INTRAVENOUS at 13:47

## 2020-10-13 RX ADMIN — EPHEDRINE SULFATE 10 MG: 50 INJECTION INTRAMUSCULAR; INTRAVENOUS; SUBCUTANEOUS at 13:44

## 2020-10-13 RX ADMIN — SODIUM CHLORIDE, POTASSIUM CHLORIDE, SODIUM LACTATE AND CALCIUM CHLORIDE 9 ML/HR: 600; 310; 30; 20 INJECTION, SOLUTION INTRAVENOUS at 12:13

## 2020-10-13 RX ADMIN — EPHEDRINE SULFATE 10 MG: 50 INJECTION INTRAMUSCULAR; INTRAVENOUS; SUBCUTANEOUS at 13:30

## 2020-10-13 NOTE — ANESTHESIA PROCEDURE NOTES
Airway  Urgency: elective    Date/Time: 10/13/2020 1:14 PM  Airway not difficult    General Information and Staff    Patient location during procedure: OR  CRNA: Melecio De Los Santos CRNA    Indications and Patient Condition  Indications for airway management: airway protection    Preoxygenated: yes  Mask difficulty assessment: 0 - not attempted    Final Airway Details  Final airway type: supraglottic airway      Successful airway: I-gel  Size 3    Number of attempts at approach: 1  Assessment: lips, teeth, and gum same as pre-op    Additional Comments  LMA placed without difficulty, ventilation with assist, equal breath sounds and symmetric chest rise and fall

## 2020-10-13 NOTE — H&P
Pre-Op H&P  Michelle Toure  [id number]  1950        HPI:    Patient is a 70 y.o.female who presents today for a bilateral breast implant removal. She notes that she's had difficulty with the implants since they were placed in 2016 and she would like them removed. She denies breast pain or redness.     She denies personal history of coronary artery disease. Per her chart, she's had PACs and PVCs in the past, which are controlled with atenolol. She denies chest pain or shortness of breath.     Review of Systems:  General ROS: negative for chills, fever or skin lesions;  No changes since last office visit.  Neg for recent sick exposure  Cardiovascular ROS: no chest pain or dyspnea on exertion  Respiratory ROS: no cough, shortness of breath, or wheezing    Allergies:   Allergies   Allergen Reactions   • Adriamycin [Doxorubicin] Palpitations     CAUSED TACHYCARDIA   • Amoxicillin Diarrhea and Rash   • Other Rash     CLAVULANATE POTASSIUM       Home Meds:    No current facility-administered medications on file prior to encounter.      Current Outpatient Medications on File Prior to Encounter   Medication Sig Dispense Refill   • atenolol (TENORMIN) 25 MG tablet Take 1/2 (one-half) tablet by mouth once daily (Patient taking differently: Take 12.5 mg by mouth Daily. Take 1/2 (one-half) tablet by mouth once daily) 45 tablet 0   • Cholecalciferol (D3-1000) 1000 units capsule Take 1,000 Units by mouth Daily.     • vitamin B-12 (CYANOCOBALAMIN) 1000 MCG tablet Take 1,000 mcg by mouth 3 (Three) Times a Week.         PMH:   Past Medical History:   Diagnosis Date   • Arthritis    • Breast cancer (CMS/HCC)     right, with radiation and chemo. 2010   • Cataract    • DDD (degenerative disc disease), lumbar    • Dermatitis    • Diverticulitis    • Drug therapy    • HTN (hypertension)    • Hx of radiation therapy 2011   • Macular degeneration    • PONV (postoperative nausea and vomiting)    • Vitamin B deficiency    • Vitamin D  "deficiency    • Wears glasses      PSH:    Past Surgical History:   Procedure Laterality Date   • AUGMENTATION MAMMAPLASTY  1979    silicone   • AUGMENTATION MAMMAPLASTY  2009    saline   • AUGMENTATION MAMMAPLASTY  11/2016    silicone with fat grafting    • BREAST BIOPSY     • BREAST CAPSULOTOMY, IMPLANT REVISION Bilateral 11/10/2016    Procedure: REVISION KELI BREAST RECONSTRUCTION WITH RIGHT CAPSULOTOMY, KELI IMPLANT EXCHANGE AND FAT GRAFTING ;  Surgeon: Latonia Resendez MD;  Location:  [facility];  Service:    • BREAST IMPLANT SURGERY Left 4/3/2017    Procedure: PLACEMENT OF BREAST IMPLANT LEFT BREAST;  Surgeon: Latonia Resendez MD;  Location:  [facility];  Service:    • BREAST LUMPECTOMY Right    • COLONOSCOPY     • COLONOSCOPY W/ BIOPSIES     • COSMETIC SURGERY      B BREAST IMPANTS IN 1979, REMOVED AND REPLACED IN 2009   • FAT GRAFTING Bilateral 11/10/2016    Procedure: FAT GRAFTING;  Surgeon: Latonia Resendez MD;  Location:  [facility];  Service:    • HYSTERECTOMY     • LASIK Bilateral    • PORTACATH PLACEMENT      REMOVED    • SKIN BIOPSY     • TOOTH EXTRACTION Right 2.5 weeks ago   • VENOUS ACCESS DEVICE (PORT) REMOVAL  2011       Social History:   Tobacco:   Social History     Tobacco Use   Smoking Status Never Smoker   Smokeless Tobacco Never Used      Alcohol:     Social History     Substance and Sexual Activity   Alcohol Use Yes    Comment: social wine/beer drinker, maybe 1 drink every month or so        Vitals:           Pulse 79   Temp 97.7 °F (36.5 °C) (Tympanic)   Resp 18   Ht 172.7 cm (68\")   Wt 70.6 kg (155 lb 10.3 oz)   SpO2 100%   BMI 23.67 kg/m²     Physical Exam:  General Appearance:    Alert, cooperative, no distress, appears stated age   Head:    Normocephalic, without obvious abnormality, atraumatic   Lungs:     Clear to auscultation bilaterally, respirations unlabored    Heart:   Regular rate and rhythm, S1 and S2 normal, no murmur, rub    or gallop    Abdomen:    Soft, nontender   Breast Exam:   "  deferred   Genitalia:    deferred   Extremities:   Extremities normal, atraumatic, no cyanosis or edema   Skin:   Skin color, texture, turgor normal, no rashes or lesions   Neurologic:   Grossly intact   Results Review  LABS:  Lab Results   Component Value Date    WBC 6.65 10/11/2020    HGB 13.1 10/11/2020    HCT 41.9 10/11/2020    MCV 88.8 10/11/2020     10/11/2020    NEUTROABS 3.64 04/03/2017    GLUCOSE 87 10/11/2020    BUN 13 10/11/2020    CREATININE 0.81 10/11/2020    EGFRIFNONA 70 10/11/2020     10/11/2020    K 4.5 10/11/2020     10/11/2020    CO2 30.0 (H) 10/11/2020    MG 2.4 09/09/2019    CALCIUM 9.5 10/11/2020       RADIOLOGY:  Imaging Results (Last 72 Hours)     ** No results found for the last 72 hours. **          I reviewed the patient's new clinical results.    Impression:   1. Presence of bilateral breast implants    Plan:   1. Bilateral breast implant removal   Patient was seen in the office and risks and benefits were discussed. Please see office note for details.     KWAN Carter   10/13/2020   11:56 EDT

## 2020-10-13 NOTE — ANESTHESIA POSTPROCEDURE EVALUATION
Patient: Michelle Toure    Procedure Summary     Date: 10/13/20 Room / Location:  [facility] / [facility]    Anesthesia Start: 1311 Anesthesia Stop: 1409    Procedure: BREAST IMPLANT REMOVAL BILATERAL (Bilateral Breast) Diagnosis:     Surgeon: Albert Garces MD Provider: David Hull MD    Anesthesia Type: general ASA Status: 2          Anesthesia Type: general    Vitals  Vitals Value Taken Time   BP     Temp     Pulse     Resp     SpO2 98 % 10/13/20 1409           Post Anesthesia Care and Evaluation    Patient location during evaluation: PACU  Patient participation: complete - patient participated  Level of consciousness: awake and alert  Pain score: 0  Pain management: adequate  Airway patency: patent  Anesthetic complications: No anesthetic complications  PONV Status: none  Cardiovascular status: hemodynamically stable and acceptable  Respiratory status: nonlabored ventilation, acceptable and nasal cannula  Hydration status: acceptable

## 2020-10-13 NOTE — ANESTHESIA PREPROCEDURE EVALUATION
Anesthesia Evaluation     Patient summary reviewed and Nursing notes reviewed   NPO Solid Status: > 8 hours  NPO Liquid Status: > 8 hours           Airway   Mallampati: I  TM distance: >3 FB  Neck ROM: full  No difficulty expected  Dental - normal exam     Pulmonary     breath sounds clear to auscultation  Cardiovascular   Exercise tolerance: good (4-7 METS)    Rhythm: regular  Rate: normal        Neuro/Psych  GI/Hepatic/Renal/Endo      Musculoskeletal     Abdominal    Substance History      OB/GYN          Other                        Anesthesia Plan    ASA 2     general     intravenous induction     Anesthetic plan, all risks, benefits, and alternatives have been provided, discussed and informed consent has been obtained with: patient.

## 2020-10-13 NOTE — OP NOTE
DATE OF PROCEDURE:  10/13/20    PREOPERATIVE DIAGNOSES:  1. Textured breast implants.     POSTOPERATIVE DIAGNOSES:  1. Textured breast implants.   2. Ruptured left breast implant.     PROCEDURES PERFORMED:  1.  Removal of bilateral breast implants.    SURGEON: Albert Garces MD    ASSISTANT: Sima Ortez PA-C     Circulator: Yue Delaney RN; Marlen Clifford RN  Scrub Person: Jefry Erica M  Assistant: Sima Ortez PA     Assistant: Sima Ortez PA  was responsible for performing the following activities: Suturing and their skilled assistance was necessary for the success of this case.      ANESTHESIA: General.    INDICATIONS FOR PROCEDURE: The patient is a 70-year-old female who had undergone bilateral mastectomies and reconstruction. She had textured implants placed. She desired removal of her implants due to the concern of texturing causing ALCL. So she was brought today for removal. She understood all the risks and benefits of the procedure, including need for future procedures, placement of a drain and elected to proceed.    OPERATIVE FINDINGS: Leaking left silicone implant.    DESCRIPTION OF PROCEDURE: The patient was seen in preoperative holding, marked and then taken to the operating room by anesthesia. Informed consent was signed and on the chart. The patient was placed supine on the operating table and general anesthesia was induced. Once verified, the case was then turned over to the surgical service. The patient had her chest and abdomen prepped and draped in normal sterile fashion. A timeout was called and all parties were in agreement, including nursing and anesthesia. Preoperative antibiotics were given. We began on the right side, making the incision from the previous incision with a 15 blade scalpel. I dissected down, removed the right implant it was noted to be intact. At this point, copious antibiotic irrigation was used in the right breast pocket. The incision was then closed in layers  with a 2-0 Vicryl in Peter’s fascia, 3-0 Monocryl and buried deep dermal fashion and 4-0 Monocryl in a running subcuticular fashion. I then moved to the left side. Made an incision through her previous incision. Dissected down to the pocket and it was noted to be a leaking silicone implant contained within the capsule. All the silicone was removed and the implant shell. I then irrigated with Aricept hibiclens solution to remove all silicone. At this time the pocket was inspected and noted to have no silicone in her pocket. The incision was then closed identically. Skin Affix skin glue was placed on the incisions. She then had Kerlix fluffs and a surgical bra placed. She was awoken, extubated and taken to PACU in stable condition. All sponge, instrument counts are correct. I, Dr. Albert Garces, was present and scrubbed for the entire procedure.    SPECIMENS REMOVED: None.    ESTIMATED BLOOD LOSS: minimal    DRAINS PLACED: None.    COMPLICATIONS: None immediate.      Albert Garces MD  10/13/20  14:10 EDT

## 2020-10-19 RX ORDER — ATENOLOL 25 MG/1
TABLET ORAL
Qty: 45 TABLET | Refills: 0 | Status: SHIPPED | OUTPATIENT
Start: 2020-10-19 | End: 2021-01-19

## 2020-10-28 LAB
QT INTERVAL: 402 MS
QTC INTERVAL: 427 MS

## 2021-01-19 RX ORDER — ATENOLOL 25 MG/1
TABLET ORAL
Qty: 15 TABLET | Refills: 2 | Status: SHIPPED | OUTPATIENT
Start: 2021-01-19 | End: 2021-04-23

## 2021-02-03 ENCOUNTER — TRANSCRIBE ORDERS (OUTPATIENT)
Dept: ADMINISTRATIVE | Facility: HOSPITAL | Age: 71
End: 2021-02-03

## 2021-03-08 ENCOUNTER — TRANSCRIBE ORDERS (OUTPATIENT)
Dept: ADMINISTRATIVE | Facility: HOSPITAL | Age: 71
End: 2021-03-08

## 2021-03-08 DIAGNOSIS — Z12.31 VISIT FOR SCREENING MAMMOGRAM: Primary | ICD-10-CM

## 2021-03-31 ENCOUNTER — OFFICE VISIT (OUTPATIENT)
Dept: CARDIOLOGY | Facility: CLINIC | Age: 71
End: 2021-03-31

## 2021-03-31 VITALS
BODY MASS INDEX: 24.71 KG/M2 | DIASTOLIC BLOOD PRESSURE: 93 MMHG | HEART RATE: 74 BPM | WEIGHT: 163 LBS | OXYGEN SATURATION: 99 % | TEMPERATURE: 97.3 F | SYSTOLIC BLOOD PRESSURE: 162 MMHG | HEIGHT: 68 IN

## 2021-03-31 DIAGNOSIS — I10 ESSENTIAL HYPERTENSION: Primary | ICD-10-CM

## 2021-03-31 DIAGNOSIS — R60.9 PERIPHERAL EDEMA: ICD-10-CM

## 2021-03-31 DIAGNOSIS — R00.2 PALPITATIONS: ICD-10-CM

## 2021-03-31 DIAGNOSIS — I49.3 PVC (PREMATURE VENTRICULAR CONTRACTION): ICD-10-CM

## 2021-03-31 DIAGNOSIS — I49.1 PREMATURE ATRIAL CONTRACTION: ICD-10-CM

## 2021-03-31 DIAGNOSIS — R06.02 SHORTNESS OF BREATH: ICD-10-CM

## 2021-03-31 PROCEDURE — 99214 OFFICE O/P EST MOD 30 MIN: CPT | Performed by: NURSE PRACTITIONER

## 2021-03-31 RX ORDER — NAPROXEN SODIUM 220 MG
220 TABLET ORAL 2 TIMES DAILY PRN
COMMUNITY
End: 2021-09-30

## 2021-03-31 NOTE — PROGRESS NOTES
Subjective   Michelle Toure is a 70 y.o. female     Chief Complaint   Patient presents with   • Follow-up   • Hypertension       HPI    Problem list:    1.  Chest pain  1.1 stress test 9/26/19-no evidence of ischemia, post-rest EF 74%  2.  Palpitations  2.1 event monitor 9/12-9/25/19-run of SVT, PACs and PVCs  3.  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  4.  Right breast cancer 2010 with 4 chemo is an 33 radiations in the lumpectomy  5. Moderna vaccination 1/29/2021; 2/26/2021    Patient is a 70-year-old female who presents today for follow-up.  She denies any chest pain or pressure.  She says she does still occasionally have palpitations.  She says it is nothing really different than what she had had in the past and for the most part is doing very good.  She denies any dizziness, presyncope, syncope, orthopnea, PND or edema.  She denies any shortness of breath with activity.  She says since having her second Covid vaccine she has had some pain in the arm.  She will try Benadryl and if it does not resolve with Benadryl on its own then we will send her in a Medrol Dosepak.  Will call us Monday if she still having symptoms.  Patient recently had a breast implants removed due to the fact that they were textured which were the ones that are increased risk for breast cancer since she has a history of breast cancer when they went in the left one had actually been leaking.    Current Outpatient Medications on File Prior to Visit   Medication Sig Dispense Refill   • atenolol (TENORMIN) 25 MG tablet Take 1/2 (one-half) tablet by mouth once daily 15  tablet 2   • Cholecalciferol (D3-1000) 1000 units capsule Take 1,000 Units by mouth Daily.     • naproxen sodium (ALEVE) 220 MG tablet Take 220 mg by mouth 2 (Two) Times a Day As Needed.     • vitamin B-12 (CYANOCOBALAMIN) 1000 MCG tablet Take 1,000 mcg by mouth 3 (Three) Times a Week.     • Zinc 50 MG capsule Take 1 tablet by mouth Daily. Zinc with vitamin C       No current facility-administered medications on file prior to visit.       ALLERGIES    Adriamycin [doxorubicin], Amoxicillin, and Other    Past Medical History:   Diagnosis Date   • Arthritis    • Breast cancer (CMS/HCC)     right, with radiation and chemo. 2010   • Cataract    • COVID-19 vaccine administered 01/29/2021    2nd vaccine 02/26/2021 Moderna   • DDD (degenerative disc disease), lumbar    • Dermatitis    • Diverticulitis    • Drug therapy    • HTN (hypertension)    • Hx of radiation therapy 2011   • Macular degeneration    • PONV (postoperative nausea and vomiting)    • Vitamin B deficiency    • Vitamin D deficiency    • Wears glasses        Social History     Socioeconomic History   • Marital status:      Spouse name: Not on file   • Number of children: Not on file   • Years of education: Not on file   • Highest education level: Not on file   Tobacco Use   • Smoking status: Never Smoker   • Smokeless tobacco: Never Used   Substance and Sexual Activity   • Alcohol use: Yes     Comment: social wine/beer drinker, maybe 1 drink every month or so    • Drug use: No   • Sexual activity: Defer       Family History   Problem Relation Age of Onset   • Heart failure Mother    • Brain cancer Father    • Stroke Sister    • Diabetes Brother    • Parkinsonism Sister    • Breast cancer Neg Hx    • Ovarian cancer Neg Hx    • Endometrial cancer Neg Hx        Review of Systems   Constitutional: Negative for appetite change, chills, fatigue and fever.   HENT: Negative for congestion, rhinorrhea and sore throat.    Eyes: Positive for visual disturbance  "(reading glasses and for distance ).   Respiratory: Negative for cough, chest tightness, shortness of breath and wheezing.    Cardiovascular: Positive for palpitations (fluttering ). Negative for chest pain and leg swelling.   Gastrointestinal: Negative for abdominal pain, blood in stool, constipation, diarrhea, nausea and vomiting.   Endocrine: Negative for cold intolerance and heat intolerance.   Genitourinary: Positive for frequency (Pt states she drinks alot ). Negative for difficulty urinating, dysuria, hematuria and urgency.   Musculoskeletal: Positive for arthralgias and back pain (lower back depends on what she is doing and how much she does ). Negative for joint swelling, neck pain and neck stiffness.   Skin: Negative for color change, rash and wound.   Allergic/Immunologic: Negative for environmental allergies and food allergies.   Neurological: Positive for weakness (left arm since she had covid vaccine she has to becareful of what she is doing ) and numbness (right foot and under the toes from Chemo it comes and goes ). Negative for dizziness, light-headedness and headaches.   Hematological: Does not bruise/bleed easily.   Psychiatric/Behavioral: Positive for sleep disturbance (hard to stay asleep she wakes up alot durning the night ).       Objective   /93 (BP Location: Left arm)   Pulse 74   Temp 97.3 °F (36.3 °C)   Ht 172.7 cm (68\")   Wt 73.9 kg (163 lb)   SpO2 99%   BMI 24.78 kg/m²   Vitals:    03/31/21 1116   BP: 162/93   BP Location: Left arm   Pulse: 74   Temp: 97.3 °F (36.3 °C)   SpO2: 99%   Weight: 73.9 kg (163 lb)   Height: 172.7 cm (68\")      Lab Results (most recent)     None        Physical Exam  Vitals reviewed.   Constitutional:       General: She is awake.      Appearance: Normal appearance. She is well-developed, well-groomed and normal weight.   HENT:      Head: Normocephalic.   Eyes:      General: Lids are normal.   Neck:      Vascular: No carotid bruit, hepatojugular " reflux or JVD.   Cardiovascular:      Rate and Rhythm: Normal rate and regular rhythm.      Pulses:           Radial pulses are 2+ on the right side and 2+ on the left side.        Dorsalis pedis pulses are 2+ on the right side and 2+ on the left side.        Posterior tibial pulses are 2+ on the right side and 2+ on the left side.      Heart sounds: Normal heart sounds.   Pulmonary:      Effort: Pulmonary effort is normal.      Breath sounds: Normal breath sounds and air entry.   Abdominal:      General: Bowel sounds are normal.      Palpations: Abdomen is soft.   Musculoskeletal:      Left upper arm: Tenderness present.      Right lower leg: No edema.      Left lower leg: No edema.   Skin:     General: Skin is warm and dry.   Neurological:      Mental Status: She is alert and oriented to person, place, and time.   Psychiatric:         Attention and Perception: Attention and perception normal.         Mood and Affect: Mood and affect normal.         Speech: Speech normal.         Behavior: Behavior normal. Behavior is cooperative.         Thought Content: Thought content normal.         Cognition and Memory: Cognition normal.         Judgment: Judgment normal.         Procedure   Procedures         Assessment/Plan      Diagnosis Plan   1. Essential hypertension     2. Palpitations     3. Premature atrial contraction     4. PVC (premature ventricular contraction)     5. Shortness of breath     6. Peripheral edema         Return in about 6 months (around 9/30/2021).    Hypertension-patient's on atenolol and doing very well.  She actually brought her blood pressure readings from home and she is rarely in the 130s usually typically 120s or less systolic.  Palpitations/PACs/PVCs-doing well on beta-blocker.  Shortness of breath-resolved.  Peripheral edema-resolved.  She will continue her medication regimen.  Follow-up in 6 months or sooner if any changes.    If patient's left arm is still sore after taking Benadryl  through Monday then she will call and we will send in a Medrol Dosepak.       Michelle Toure  reports that she has never smoked. She has never used smokeless tobacco..     Patient's Body mass index is 24.78 kg/m². BMI is within normal range . Advance Care Planning   ACP discussion was declined by the patient. Patient does not have an advance directive, declines further assistance.  Electronically signed by:         No significant past surgical history

## 2021-03-31 NOTE — PATIENT INSTRUCTIONS
Advance Care Planning and Advance Directives     You make decisions on a daily basis - decisions about where you want to live, your career, your home, your life. Perhaps one of the most important decisions you face is your choice for future medical care. Take time to talk with your family and your healthcare team and start planning today.  Advance Care Planning is a process that can help you:  · Understand possible future healthcare decisions in light of your own experiences  · Reflect on those decision in light of your goals and values  · Discuss your decisions with those closest to you and the healthcare professionals that care for you  · Make a plan by creating a document that reflects your wishes    Surrogate Decision Maker  In the event of a medical emergency, which has left you unable to communicate or to make your own decisions, you would need someone to make decisions for you.  It is important to discuss your preferences for medical treatment with this person while you are in good health.     Qualities of a surrogate decision maker:  • Willing to take on this role and responsibility  • Knows what you want for future medical care  • Willing to follow your wishes even if they don't agree with them  • Able to make difficult medical decisions under stressful circumstances    Advance Directives  These are legal documents you can create that will guide your healthcare team and decision maker(s) when needed. These documents can be stored in the electronic medical record.    · Living Will - a legal document to guide your care if you have a terminal condition or a serious illness and are unable to communicate. States vary by statute in document names/types, but most forms may include one or more of the following:        -  Directions regarding life-prolonging treatments        -  Directions regarding artificially provided nutrition/hydration        -  Choosing a healthcare decision maker        -  Direction  regarding organ/tissue donation    · Durable Power of  for Healthcare - this document names an -in-fact to make medical decisions for you, but it may also allow this person to make personal and financial decisions for you. Please seek the advice of an  if you need this type of document.    **Advance Directives are not required and no one may discriminate against you if you do not sign one.    Medical Orders  Many states allow specific forms/orders signed by your physician to record your wishes for medical treatment in your current state of health. This form, signed in personal communication with your physician, addresses resuscitation and other medical interventions that you may or may not want.      For more information or to schedule a time with a [facility] Advance Care Planning Facilitator contact: [email or web address] or call [phone] and someone will contact you directly.

## 2021-04-12 ENCOUNTER — HOSPITAL ENCOUNTER (OUTPATIENT)
Dept: MAMMOGRAPHY | Facility: HOSPITAL | Age: 71
Discharge: HOME | End: 2021-04-12
Admitting: INTERNAL MEDICINE

## 2021-04-12 DIAGNOSIS — Z12.31 VISIT FOR SCREENING MAMMOGRAM: ICD-10-CM

## 2021-04-12 PROCEDURE — 77063 BREAST TOMOSYNTHESIS BI: CPT

## 2021-04-12 PROCEDURE — 77067 SCR MAMMO BI INCL CAD: CPT | Performed by: RADIOLOGY

## 2021-04-12 PROCEDURE — 77067 SCR MAMMO BI INCL CAD: CPT

## 2021-04-12 PROCEDURE — 77063 BREAST TOMOSYNTHESIS BI: CPT | Performed by: RADIOLOGY

## 2021-04-23 RX ORDER — ATENOLOL 25 MG/1
TABLET ORAL
Qty: 45 TABLET | Refills: 3 | Status: SHIPPED | OUTPATIENT
Start: 2021-04-23 | End: 2021-09-30 | Stop reason: SDUPTHER

## 2021-09-30 ENCOUNTER — OFFICE VISIT (OUTPATIENT)
Dept: CARDIOLOGY | Facility: CLINIC | Age: 71
End: 2021-09-30

## 2021-09-30 ENCOUNTER — TELEPHONE (OUTPATIENT)
Dept: CARDIOLOGY | Facility: CLINIC | Age: 71
End: 2021-09-30

## 2021-09-30 ENCOUNTER — LAB (OUTPATIENT)
Dept: CARDIOLOGY | Facility: CLINIC | Age: 71
End: 2021-09-30

## 2021-09-30 VITALS
SYSTOLIC BLOOD PRESSURE: 170 MMHG | HEART RATE: 61 BPM | WEIGHT: 158 LBS | TEMPERATURE: 98.8 F | DIASTOLIC BLOOD PRESSURE: 98 MMHG | HEIGHT: 68 IN | OXYGEN SATURATION: 99 % | BODY MASS INDEX: 23.95 KG/M2

## 2021-09-30 DIAGNOSIS — I49.1 PREMATURE ATRIAL CONTRACTION: ICD-10-CM

## 2021-09-30 DIAGNOSIS — I49.3 PVC (PREMATURE VENTRICULAR CONTRACTION): ICD-10-CM

## 2021-09-30 DIAGNOSIS — I10 ESSENTIAL HYPERTENSION: ICD-10-CM

## 2021-09-30 DIAGNOSIS — R60.9 PERIPHERAL EDEMA: ICD-10-CM

## 2021-09-30 DIAGNOSIS — R00.2 PALPITATIONS: ICD-10-CM

## 2021-09-30 DIAGNOSIS — R06.02 SHORTNESS OF BREATH: ICD-10-CM

## 2021-09-30 DIAGNOSIS — I10 ESSENTIAL HYPERTENSION: Primary | ICD-10-CM

## 2021-09-30 LAB
ALBUMIN SERPL-MCNC: 4.97 G/DL (ref 3.5–5.2)
ALBUMIN/GLOB SERPL: 2.6 G/DL
ALP SERPL-CCNC: 73 U/L (ref 39–117)
ALT SERPL W P-5'-P-CCNC: 12 U/L (ref 1–33)
ANION GAP SERPL CALCULATED.3IONS-SCNC: 14.5 MMOL/L (ref 5–15)
AST SERPL-CCNC: 16 U/L (ref 1–32)
BASOPHILS # BLD AUTO: 0.04 10*3/MM3 (ref 0–0.2)
BASOPHILS NFR BLD AUTO: 0.6 % (ref 0–1.5)
BILIRUB SERPL-MCNC: 0.5 MG/DL (ref 0–1.2)
BUN SERPL-MCNC: 14 MG/DL (ref 8–23)
BUN/CREAT SERPL: 17.9 (ref 7–25)
CALCIUM SPEC-SCNC: 9.7 MG/DL (ref 8.6–10.5)
CHLORIDE SERPL-SCNC: 102 MMOL/L (ref 98–107)
CHOLEST SERPL-MCNC: 209 MG/DL (ref 0–200)
CO2 SERPL-SCNC: 22.5 MMOL/L (ref 22–29)
CREAT SERPL-MCNC: 0.78 MG/DL (ref 0.57–1)
DEPRECATED RDW RBC AUTO: 42.8 FL (ref 37–54)
EOSINOPHIL # BLD AUTO: 0.06 10*3/MM3 (ref 0–0.4)
EOSINOPHIL NFR BLD AUTO: 0.9 % (ref 0.3–6.2)
ERYTHROCYTE [DISTWIDTH] IN BLOOD BY AUTOMATED COUNT: 13.2 % (ref 12.3–15.4)
GFR SERPL CREATININE-BSD FRML MDRD: 73 ML/MIN/1.73
GLOBULIN UR ELPH-MCNC: 1.9 GM/DL
GLUCOSE SERPL-MCNC: 88 MG/DL (ref 65–99)
HCT VFR BLD AUTO: 44.7 % (ref 34–46.6)
HDLC SERPL-MCNC: 61 MG/DL (ref 40–60)
HGB BLD-MCNC: 14 G/DL (ref 12–15.9)
IMM GRANULOCYTES # BLD AUTO: 0.02 10*3/MM3 (ref 0–0.05)
IMM GRANULOCYTES NFR BLD AUTO: 0.3 % (ref 0–0.5)
LDLC SERPL CALC-MCNC: 127 MG/DL (ref 0–100)
LDLC/HDLC SERPL: 2.04 {RATIO}
LYMPHOCYTES # BLD AUTO: 1.46 10*3/MM3 (ref 0.7–3.1)
LYMPHOCYTES NFR BLD AUTO: 21.1 % (ref 19.6–45.3)
MAGNESIUM SERPL-MCNC: 2.4 MG/DL (ref 1.6–2.4)
MCH RBC QN AUTO: 27.8 PG (ref 26.6–33)
MCHC RBC AUTO-ENTMCNC: 31.3 G/DL (ref 31.5–35.7)
MCV RBC AUTO: 88.9 FL (ref 79–97)
MONOCYTES # BLD AUTO: 0.57 10*3/MM3 (ref 0.1–0.9)
MONOCYTES NFR BLD AUTO: 8.2 % (ref 5–12)
NEUTROPHILS NFR BLD AUTO: 4.77 10*3/MM3 (ref 1.7–7)
NEUTROPHILS NFR BLD AUTO: 68.9 % (ref 42.7–76)
NRBC BLD AUTO-RTO: 0 /100 WBC (ref 0–0.2)
PLATELET # BLD AUTO: 241 10*3/MM3 (ref 140–450)
PMV BLD AUTO: 9.8 FL (ref 6–12)
POTASSIUM SERPL-SCNC: 4.5 MMOL/L (ref 3.5–5.2)
PROT SERPL-MCNC: 6.9 G/DL (ref 6–8.5)
RBC # BLD AUTO: 5.03 10*6/MM3 (ref 3.77–5.28)
SODIUM SERPL-SCNC: 139 MMOL/L (ref 136–145)
T4 FREE SERPL-MCNC: 1.33 NG/DL (ref 0.93–1.7)
TRIGL SERPL-MCNC: 117 MG/DL (ref 0–150)
TSH SERPL DL<=0.05 MIU/L-ACNC: 1.88 UIU/ML (ref 0.27–4.2)
VLDLC SERPL-MCNC: 21 MG/DL (ref 5–40)
WBC # BLD AUTO: 6.92 10*3/MM3 (ref 3.4–10.8)

## 2021-09-30 PROCEDURE — 83735 ASSAY OF MAGNESIUM: CPT | Performed by: NURSE PRACTITIONER

## 2021-09-30 PROCEDURE — 80061 LIPID PANEL: CPT | Performed by: NURSE PRACTITIONER

## 2021-09-30 PROCEDURE — 36415 COLL VENOUS BLD VENIPUNCTURE: CPT

## 2021-09-30 PROCEDURE — 80053 COMPREHEN METABOLIC PANEL: CPT | Performed by: NURSE PRACTITIONER

## 2021-09-30 PROCEDURE — 85025 COMPLETE CBC W/AUTO DIFF WBC: CPT | Performed by: NURSE PRACTITIONER

## 2021-09-30 PROCEDURE — 84443 ASSAY THYROID STIM HORMONE: CPT | Performed by: NURSE PRACTITIONER

## 2021-09-30 PROCEDURE — 84481 FREE ASSAY (FT-3): CPT | Performed by: NURSE PRACTITIONER

## 2021-09-30 PROCEDURE — 84439 ASSAY OF FREE THYROXINE: CPT | Performed by: NURSE PRACTITIONER

## 2021-09-30 PROCEDURE — 99214 OFFICE O/P EST MOD 30 MIN: CPT | Performed by: NURSE PRACTITIONER

## 2021-09-30 RX ORDER — ATENOLOL 25 MG/1
12.5 TABLET ORAL DAILY
Qty: 90 TABLET | Refills: 3 | Status: SHIPPED | OUTPATIENT
Start: 2021-09-30 | End: 2022-11-23

## 2021-09-30 RX ORDER — IBUPROFEN 800 MG/1
800 TABLET ORAL EVERY 6 HOURS PRN
COMMUNITY

## 2021-09-30 NOTE — TELEPHONE ENCOUNTER
Left mess for pt regarding lab results and to watch red meats and fried foods,    Labs     TSH   0.270 - 4.200 uIU/mL 1.880      Creatinine   0.57 - 1.00 mg/dL 0.78      Sodium   136 - 145 mmol/L 139      ALT (SGPT)   1 - 33 U/L 12      AST (SGOT)   1 - 32 U/L 16        LDL Cholesterol    0 - 100 mg/dL 127High       HDL Cholesterol   40 - 60 mg/dL 61High      Triglycerides   0 - 150 mg/dL 117      Magnesium   1.6 - 2.4 mg/dL 2.4        ALEXANDRIA Lewis

## 2021-09-30 NOTE — PROGRESS NOTES
Subjective   Michelle Toure is a 71 y.o. female     Chief Complaint   Patient presents with   • Follow-up   • Hypertension       HPI    Problem list:    1.  Chest pain  1.1 stress test 9/26/19-no evidence of ischemia, post-rest EF 74%  2.  Palpitations  2.1 event monitor 9/12-9/25/19-run of SVT, PACs and PVCs  3.  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  4.  Right breast cancer 2010 with 4 chemo is an 33 radiations in the lumpectomy  5. Moderna vaccination 1/29/2021; 2/26/2021    Patient is a 71-year-old female who presents today for follow-up.  She denies any chest pain, pressure, dizziness, presyncope, syncope, orthopnea, PND or edema.  She says in the early part of summer she did have a few palpitations but she has not had any since.  She says she does not have any shortness of breath and she been working in her yard all summer.  She says she still does not feel like she is as active as she had been because she had a compression fracture at T12 back in March.  She did not go to neurosurgeon like she was supposed to and decided to try to let it heal on her own.  She thinks she did this by lifting up some bricks.  Patient says overall she has done well.  She has not had any recent blood work.    Patient did bring her blood pressure readings in and they were excellent.    Current Outpatient Medications on File Prior to Visit   Medication Sig Dispense Refill   • Cholecalciferol (D3-1000) 1000 units capsule Take 1,000 Units by mouth Daily.     • ibuprofen (ADVIL,MOTRIN) 800 MG tablet Take 800 mg by mouth Every 6 (Six) Hours As Needed for Mild  Pain .     • vitamin B-12 (CYANOCOBALAMIN) 1000 MCG tablet Take 1,000 mcg by mouth 3 (Three) Times a Week.     • Zinc 50 MG capsule Take 1 tablet by mouth Daily. Zinc with vitamin C  3 times a week     • [DISCONTINUED] atenolol (TENORMIN) 25 MG tablet Take 1/2 (one-half) tablet by mouth once daily 45 tablet 3   • [DISCONTINUED] naproxen sodium (ALEVE) 220 MG tablet Take 220 mg by mouth 2 (Two) Times a Day As Needed.       No current facility-administered medications on file prior to visit.       ALLERGIES    Adriamycin [doxorubicin], Amoxicillin, and Other    Past Medical History:   Diagnosis Date   • Arthritis    • Arthritis    • Breast cancer (CMS/HCC)     right, with radiation and chemo. 2010   • Cataract    • COVID-19 vaccine administered 01/29/2021    2nd vaccine 02/26/2021 Moderna   • DDD (degenerative disc disease), lumbar    • Dermatitis    • Diverticulitis    • Drug therapy    • HTN (hypertension)    • Hx of radiation therapy 2011   • Macular degeneration    • PONV (postoperative nausea and vomiting)    • Vitamin B deficiency    • Vitamin D deficiency    • Wears glasses        Social History     Socioeconomic History   • Marital status:      Spouse name: Not on file   • Number of children: Not on file   • Years of education: Not on file   • Highest education level: Not on file   Tobacco Use   • Smoking status: Never Smoker   • Smokeless tobacco: Never Used   Substance and Sexual Activity   • Alcohol use: Yes     Comment: social wine/beer drinker, maybe 1 drink every month or so    • Drug use: No   • Sexual activity: Defer       Family History   Problem Relation Age of Onset   • Heart failure Mother    • Brain cancer Father    • Stroke Sister    • Diabetes Brother    • Parkinsonism Sister    • Breast cancer Neg Hx    • Ovarian cancer Neg Hx    • Endometrial cancer Neg Hx        Review of Systems   Constitutional: Positive for fatigue (not active enough ). Negative for appetite change, chills,  "diaphoresis and fever.   HENT: Negative for congestion, rhinorrhea and sore throat.    Eyes: Positive for visual disturbance (glasses).   Respiratory: Negative for cough, chest tightness, shortness of breath (been working in yard) and wheezing.    Cardiovascular: Positive for palpitations (a few over the summer; earlier part ). Negative for chest pain and leg swelling.   Gastrointestinal: Negative for abdominal pain, blood in stool, constipation, diarrhea, nausea and vomiting.   Endocrine: Negative for cold intolerance and heat intolerance.   Genitourinary: Negative for difficulty urinating, dysuria, frequency, hematuria and urgency.   Musculoskeletal: Positive for arthralgias (back , osto ) and back pain (compression FX; end of march beginning of april, thinks from lifting concrete blocks). Negative for joint swelling, neck pain and neck stiffness.   Skin: Positive for wound (spider bite right lower leg ). Negative for color change, pallor and rash.   Allergic/Immunologic: Negative for environmental allergies and food allergies.   Neurological: Positive for weakness (left arm after she got the 2nd Covid vaccine 02/26/2021) and numbness (under her right foot ( toe) ). Negative for dizziness, light-headedness and headaches.   Hematological: Bruises/bleeds easily (Bruises ).   Psychiatric/Behavioral: Positive for sleep disturbance (hard to go to sleep she wakes up a few times having to use the restroom ).       Objective   /98 (BP Location: Left arm)   Pulse 61   Temp 98.8 °F (37.1 °C)   Ht 172.7 cm (68\")   Wt 71.7 kg (158 lb)   SpO2 99%   BMI 24.02 kg/m²   Vitals:    09/30/21 1013 09/30/21 1025   BP: (!) 174/104 170/98   BP Location: Left arm Left arm   Pulse: 61    Temp: 98.8 °F (37.1 °C)    SpO2: 99%    Weight: 71.7 kg (158 lb)    Height: 172.7 cm (68\")       Lab Results (most recent)     None        Physical Exam  Vitals reviewed.   Constitutional:       General: She is awake.      Appearance: Normal " appearance. She is well-developed, well-groomed and normal weight.   HENT:      Head: Normocephalic.   Eyes:      General: Lids are normal.      Comments: Wears glasses    Neck:      Vascular: No carotid bruit, hepatojugular reflux or JVD.   Cardiovascular:      Rate and Rhythm: Normal rate and regular rhythm.      Pulses:           Radial pulses are 2+ on the right side and 2+ on the left side.        Dorsalis pedis pulses are 2+ on the right side and 2+ on the left side.        Posterior tibial pulses are 2+ on the right side and 2+ on the left side.      Heart sounds: Normal heart sounds.   Pulmonary:      Effort: Pulmonary effort is normal.      Breath sounds: Normal breath sounds and air entry.   Abdominal:      General: Bowel sounds are normal.      Palpations: Abdomen is soft.   Musculoskeletal:      Thoracic back: Tenderness present.      Right lower leg: No edema.      Left lower leg: No edema.   Skin:     General: Skin is warm and dry.      Comments: Healing wound left calf   Neurological:      Mental Status: She is alert and oriented to person, place, and time.   Psychiatric:         Attention and Perception: Attention and perception normal.         Mood and Affect: Mood and affect normal.         Speech: Speech normal.         Behavior: Behavior normal. Behavior is cooperative.         Thought Content: Thought content normal.         Cognition and Memory: Cognition and memory normal.         Judgment: Judgment normal.         Procedure   Procedures         Assessment/Plan      Diagnosis Plan   1. Essential hypertension  atenolol (TENORMIN) 25 MG tablet    Comprehensive Metabolic Panel    Lipid Panel    CBC & Differential   2. Premature atrial contraction  atenolol (TENORMIN) 25 MG tablet    TSH    Magnesium    T3, Free    T4, Free   3. PVC (premature ventricular contraction)  atenolol (TENORMIN) 25 MG tablet    TSH    Magnesium    T3, Free    T4, Free   4. Shortness of breath     5. Peripheral edema      6. Palpitations  TSH    Magnesium    T3, Free    T4, Free       Return in about 6 months (around 3/30/2022).    Hypertension-patient's on atenolol and doing well.  PACs/PVCs/palpitations-patient again is on atenolol and per her report she is doing very well.  Shortness of breath-stable.  Peripheral edema-she denied any further episodes.  Patient will get a CMP, lipid, CBC, TSH, free T3, free T4 and magnesium today.  She will continue her medication regimen.  She will follow-up in 6 months or sooner if any changes.       Michelle VERONICA Mesfin  reports that she has never smoked. She has never used smokeless tobacco.. Advance Care Planning   ACP discussion was declined by the patient. Patient does not have an advance directive, declines further assistance. Patient did not bring med list or medicine bottles to appointment, med list has been reviewed and updated based on patient's knowledge of their meds.     Electronically signed by:

## 2021-09-30 NOTE — PATIENT INSTRUCTIONS
Advance Care Planning and Advance Directives     You make decisions on a daily basis - decisions about where you want to live, your career, your home, your life. Perhaps one of the most important decisions you face is your choice for future medical care. Take time to talk with your family and your healthcare team and start planning today.  Advance Care Planning is a process that can help you:  · Understand possible future healthcare decisions in light of your own experiences  · Reflect on those decision in light of your goals and values  · Discuss your decisions with those closest to you and the healthcare professionals that care for you  · Make a plan by creating a document that reflects your wishes    Surrogate Decision Maker  In the event of a medical emergency, which has left you unable to communicate or to make your own decisions, you would need someone to make decisions for you.  It is important to discuss your preferences for medical treatment with this person while you are in good health.     Qualities of a surrogate decision maker:  • Willing to take on this role and responsibility  • Knows what you want for future medical care  • Willing to follow your wishes even if they don't agree with them  • Able to make difficult medical decisions under stressful circumstances    Advance Directives  These are legal documents you can create that will guide your healthcare team and decision maker(s) when needed. These documents can be stored in the electronic medical record.    · Living Will - a legal document to guide your care if you have a terminal condition or a serious illness and are unable to communicate. States vary by statute in document names/types, but most forms may include one or more of the following:        -  Directions regarding life-prolonging treatments        -  Directions regarding artificially provided nutrition/hydration        -  Choosing a healthcare decision maker        -  Direction  regarding organ/tissue donation    · Durable Power of  for Healthcare - this document names an -in-fact to make medical decisions for you, but it may also allow this person to make personal and financial decisions for you. Please seek the advice of an  if you need this type of document.    **Advance Directives are not required and no one may discriminate against you if you do not sign one.    Medical Orders  Many states allow specific forms/orders signed by your physician to record your wishes for medical treatment in your current state of health. This form, signed in personal communication with your physician, addresses resuscitation and other medical interventions that you may or may not want.      For more information or to schedule a time with a [facility] Advance Care Planning Facilitator contact: [email or web address] or call [phone] and someone will contact you directly.

## 2021-09-30 NOTE — TELEPHONE ENCOUNTER
----- Message from FIDELIA Breaux sent at 9/30/2021  4:10 PM EDT -----  Please advise patient of lab results.  Her LDL is elevated, needs to watch red meat and fried foods.  Forwarded labs to PCP.

## 2021-10-01 LAB — T3FREE SERPL-MCNC: 3.76 PG/ML (ref 2–4.4)

## 2022-03-10 ENCOUNTER — TRANSCRIBE ORDERS (OUTPATIENT)
Dept: ADMINISTRATIVE | Facility: HOSPITAL | Age: 72
End: 2022-03-10

## 2022-03-10 DIAGNOSIS — Z12.31 SCREENING MAMMOGRAM FOR BREAST CANCER: Primary | ICD-10-CM

## 2022-03-31 ENCOUNTER — OFFICE VISIT (OUTPATIENT)
Dept: CARDIOLOGY | Facility: CLINIC | Age: 72
End: 2022-03-31

## 2022-03-31 VITALS
DIASTOLIC BLOOD PRESSURE: 100 MMHG | OXYGEN SATURATION: 100 % | TEMPERATURE: 97.8 F | BODY MASS INDEX: 25.44 KG/M2 | HEIGHT: 67 IN | HEART RATE: 78 BPM | WEIGHT: 162.1 LBS | SYSTOLIC BLOOD PRESSURE: 156 MMHG

## 2022-03-31 DIAGNOSIS — I49.1 PREMATURE ATRIAL CONTRACTION: ICD-10-CM

## 2022-03-31 DIAGNOSIS — I10 ESSENTIAL HYPERTENSION: Primary | ICD-10-CM

## 2022-03-31 DIAGNOSIS — R00.2 PALPITATIONS: ICD-10-CM

## 2022-03-31 DIAGNOSIS — I47.1 PSVT (PAROXYSMAL SUPRAVENTRICULAR TACHYCARDIA): ICD-10-CM

## 2022-03-31 DIAGNOSIS — I51.89 GRADE I DIASTOLIC DYSFUNCTION: ICD-10-CM

## 2022-03-31 DIAGNOSIS — R60.9 PERIPHERAL EDEMA: ICD-10-CM

## 2022-03-31 DIAGNOSIS — R06.02 SHORTNESS OF BREATH: ICD-10-CM

## 2022-03-31 DIAGNOSIS — I49.3 PVC (PREMATURE VENTRICULAR CONTRACTION): ICD-10-CM

## 2022-03-31 PROCEDURE — 93000 ELECTROCARDIOGRAM COMPLETE: CPT | Performed by: NURSE PRACTITIONER

## 2022-03-31 PROCEDURE — 99214 OFFICE O/P EST MOD 30 MIN: CPT | Performed by: NURSE PRACTITIONER

## 2022-03-31 NOTE — PROGRESS NOTES
Subjective   Michelle Toure is a 71 y.o. female     Chief Complaint   Patient presents with   • Follow-up     6 month       HPI    Problem list:    1.  Chest pain  1.1 stress test 9/26/19-no evidence of ischemia, post-rest EF 74%  2.  Palpitations  2.1 event monitor 9/12-9/25/19-run of SVT, PACs and PVCs  3.  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  4.  Right breast cancer 2010 with 4 chemo is an 33 radiations in the lumpectomy  5. Moderna vaccination 1/29/2021; 2/26/2021    Patient is a 71-year-old female who presents today for a follow-up.  She denies any chest pain, pressure, palpitations, fluttering, dizziness, presyncope, syncope, orthopnea, PND or edema.  She denies any shortness of breath with activity.  She says she has been doing very good.  She has just been feeling around her house and doing some house remodeling.    Current Outpatient Medications on File Prior to Visit   Medication Sig Dispense Refill   • atenolol (TENORMIN) 25 MG tablet Take 0.5 tablets by mouth Daily. 90 tablet 3   • CALCIUM-MAGNESIUM-VITAMIN D PO Take  by mouth.     • Cholecalciferol 25 MCG (1000 UT) capsule Take 1,000 Units by mouth Daily.     • ibuprofen (ADVIL,MOTRIN) 800 MG tablet Take 800 mg by mouth Every 6 (Six) Hours As Needed for Mild Pain .     • vitamin B-12 (CYANOCOBALAMIN) 1000 MCG tablet Take 1,000 mcg by mouth 3 (Three) Times a Week.     • [DISCONTINUED] Zinc 50 MG capsule Take 1 tablet by mouth Daily. Zinc with vitamin C  3 times a week       No current facility-administered medications on file prior to visit.       ALLERGIES    Adriamycin  [doxorubicin], Amoxicillin, and Other    Past Medical History:   Diagnosis Date   • Arthritis    • Arthritis    • Breast cancer (HCC)     right, with radiation and chemo. 2010   • Cataract    • COVID-19 vaccine administered 01/29/2021    2nd vaccine 02/26/2021 Moderna   • DDD (degenerative disc disease), lumbar    • Dermatitis    • Diverticulitis    • Drug therapy    • HTN (hypertension)    • Hx of radiation therapy 2011   • Macular degeneration    • PONV (postoperative nausea and vomiting)    • Vitamin B deficiency    • Vitamin D deficiency    • Wears glasses        Social History     Socioeconomic History   • Marital status:    Tobacco Use   • Smoking status: Never Smoker   • Smokeless tobacco: Never Used   Substance and Sexual Activity   • Alcohol use: Yes     Comment: social wine/beer drinker, maybe 1 drink every month or so    • Drug use: No   • Sexual activity: Defer       Family History   Problem Relation Age of Onset   • Heart failure Mother    • Brain cancer Father    • Stroke Sister    • Diabetes Brother    • Parkinsonism Sister    • Breast cancer Neg Hx    • Ovarian cancer Neg Hx    • Endometrial cancer Neg Hx        Review of Systems   Constitutional: Negative for activity change, appetite change, chills, fatigue and fever.   HENT: Positive for rhinorrhea (Allergies). Negative for congestion, sinus pressure, sinus pain and sore throat.    Eyes: Positive for visual disturbance.   Respiratory: Negative for apnea, cough, chest tightness, shortness of breath and wheezing.    Cardiovascular: Negative for chest pain, palpitations and leg swelling.   Gastrointestinal: Negative for abdominal pain, blood in stool, diarrhea, nausea and vomiting.   Endocrine: Negative for cold intolerance and heat intolerance.   Genitourinary: Negative for difficulty urinating, dysuria, frequency, hematuria and urgency.   Musculoskeletal: Positive for back pain (Small conpression fraction x1 year prior). Negative for gait  "problem, joint swelling, neck pain and neck stiffness. Arthralgias: Martin wrist,rt foot.   Skin: Negative for color change, rash and wound.   Allergic/Immunologic: Negative for environmental allergies and food allergies.   Neurological: Positive for numbness (Numbness in the bottle of foot area, not consistent. Pt states she thinks its caused from chemo from breast cancer [id number]). Negative for dizziness, syncope, weakness, light-headedness and headaches.   Hematological: Bruises/bleeds easily (Bruises).   Psychiatric/Behavioral: Negative for sleep disturbance.       Objective   /100 (BP Location: Left arm, Patient Position: Sitting)   Pulse 78   Temp 97.8 °F (36.6 °C)   Ht 170.2 cm (67\")   Wt 73.5 kg (162 lb 1.6 oz)   SpO2 100%   BMI 25.39 kg/m²   Vitals:    03/31/22 1001   BP: 156/100   BP Location: Left arm   Patient Position: Sitting   Pulse: 78   Temp: 97.8 °F (36.6 °C)   SpO2: 100%   Weight: 73.5 kg (162 lb 1.6 oz)   Height: 170.2 cm (67\")      Lab Results (most recent)     None        Physical Exam  Vitals reviewed.   Constitutional:       General: She is awake.      Appearance: Normal appearance. She is well-developed, well-groomed and overweight.   HENT:      Head: Normocephalic.   Eyes:      General: Lids are normal.      Comments: Wears glasses   Neck:      Vascular: No carotid bruit, hepatojugular reflux or JVD.   Cardiovascular:      Rate and Rhythm: Normal rate and regular rhythm.      Pulses:           Radial pulses are 2+ on the right side and 2+ on the left side.        Dorsalis pedis pulses are 2+ on the right side and 2+ on the left side.        Posterior tibial pulses are 2+ on the right side and 2+ on the left side.      Heart sounds: Normal heart sounds.   Pulmonary:      Effort: Pulmonary effort is normal.      Breath sounds: Normal breath sounds and air entry.   Abdominal:      General: Bowel sounds are normal.      Palpations: Abdomen is soft.   Musculoskeletal:      Right lower " leg: No edema.      Left lower leg: No edema.   Skin:     General: Skin is warm and dry.   Neurological:      Mental Status: She is alert and oriented to person, place, and time.   Psychiatric:         Attention and Perception: Attention and perception normal.         Mood and Affect: Mood and affect normal.         Speech: Speech normal.         Behavior: Behavior normal. Behavior is cooperative.         Thought Content: Thought content normal.         Cognition and Memory: Cognition and memory normal.         Judgment: Judgment normal.         Procedure     ECG 12 Lead    Date/Time: 3/31/2022 10:02 AM  Performed by: Ashlie Dos Santos APRN  Authorized by: Ashlie Dos Santos APRN   Comparison: compared with previous ECG from 10/11/2020  Similar to previous ECG  Rhythm: sinus rhythm  Rate: normal  BPM: 69  QRS axis: normal    Clinical impression: normal ECG                 Assessment/Plan      Diagnosis Plan   1. Essential hypertension  ECG 12 Lead   2. Palpitations  ECG 12 Lead   3. Premature atrial contraction  ECG 12 Lead   4. PVC (premature ventricular contraction)  ECG 12 Lead   5. Shortness of breath     6. Peripheral edema     7. Grade I diastolic dysfunction     8. PSVT (paroxysmal supraventricular tachycardia) ([facility])  ECG 12 Lead       Return in about 6 months (around 9/30/2022).    Hypertension-patient's on atenolol and doing well.  Her blood pressure is always elevated when she goes to the doctor's office.  Palpitations/PACs/PVCs/PSVT-doing well on beta-blocker.  Shortness of breath-resolved.  Peripheral edema/diastolic dysfunction-no signs of failure.  Patient will continue her medication regimen.  She will follow-up in 6 months or sooner if any changes.     Patient brought in medicine list to appointment, it's been reviewed with patient and med list was updated in the chart.     Advance Care Planning   ACP discussion was declined by the patient. Patient does not have an advance directive, declines further  assistance.       Electronically signed by:

## 2022-04-19 ENCOUNTER — HOSPITAL ENCOUNTER (OUTPATIENT)
Dept: MAMMOGRAPHY | Facility: HOSPITAL | Age: 72
Discharge: HOME | End: 2022-04-19
Admitting: INTERNAL MEDICINE

## 2022-04-19 DIAGNOSIS — Z12.31 SCREENING MAMMOGRAM FOR BREAST CANCER: ICD-10-CM

## 2022-04-19 PROCEDURE — 77063 BREAST TOMOSYNTHESIS BI: CPT | Performed by: RADIOLOGY

## 2022-04-19 PROCEDURE — 77063 BREAST TOMOSYNTHESIS BI: CPT

## 2022-04-19 PROCEDURE — 77067 SCR MAMMO BI INCL CAD: CPT | Performed by: RADIOLOGY

## 2022-04-19 PROCEDURE — 77067 SCR MAMMO BI INCL CAD: CPT

## 2022-08-31 NOTE — ANESTHESIA PREPROCEDURE EVALUATION
Anesthesia Evaluation     Patient summary reviewed and Nursing notes reviewed   history of anesthetic complications (PONV): PONV  NPO Status: > 8 hours   Airway   Mallampati: II  TM distance: >3 FB  Neck ROM: full  no difficulty expected  Dental      Pulmonary    Cardiovascular   Exercise tolerance: good (4-7 METS)    ECG reviewed      ROS comment: Normal sinus rhythm  Possible Left atrial enlargement  Borderline ECG    Neuro/Psych  GI/Hepatic/Renal/Endo      Musculoskeletal     Abdominal    Substance History      OB/GYN          Other   (+) arthritis     ROS/Med Hx Other: HCT pending                          Anesthesia Plan    ASA 2     general     Anesthetic plan and risks discussed with patient.    Plan discussed with CRNA.       Fill out school and sports PE.  Give Menveo #2 and discuss side effects with mother.  Fill out PHQ 2/9 and discuss patient's result with mother.  Re contact clinic as needed for acute illness.

## 2022-10-04 ENCOUNTER — OFFICE VISIT (OUTPATIENT)
Dept: CARDIOLOGY | Facility: CLINIC | Age: 72
End: 2022-10-04

## 2022-10-04 VITALS
TEMPERATURE: 97.4 F | DIASTOLIC BLOOD PRESSURE: 104 MMHG | SYSTOLIC BLOOD PRESSURE: 161 MMHG | HEIGHT: 66 IN | BODY MASS INDEX: 25.88 KG/M2 | OXYGEN SATURATION: 99 % | HEART RATE: 66 BPM | WEIGHT: 161 LBS

## 2022-10-04 DIAGNOSIS — I10 ESSENTIAL HYPERTENSION: Primary | ICD-10-CM

## 2022-10-04 DIAGNOSIS — R06.02 SHORTNESS OF BREATH: ICD-10-CM

## 2022-10-04 DIAGNOSIS — I49.3 PVC (PREMATURE VENTRICULAR CONTRACTION): ICD-10-CM

## 2022-10-04 DIAGNOSIS — I51.89 GRADE I DIASTOLIC DYSFUNCTION: ICD-10-CM

## 2022-10-04 DIAGNOSIS — I49.1 PREMATURE ATRIAL CONTRACTION: ICD-10-CM

## 2022-10-04 DIAGNOSIS — R60.9 PERIPHERAL EDEMA: ICD-10-CM

## 2022-10-04 PROCEDURE — 99214 OFFICE O/P EST MOD 30 MIN: CPT | Performed by: NURSE PRACTITIONER

## 2022-10-04 NOTE — PROGRESS NOTES
Subjective   Michelle Toure is a 72 y.o. female     Chief Complaint   Patient presents with   • Follow-up     Hypertension        HPI    Problem list:    1.  Chest pain  1.1 stress test 9/26/19-no evidence of ischemia, post-rest EF 74%  2.  Palpitations  2.1 event monitor 9/12-9/25/19-run of SVT, PACs and PVCs  3.  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  4.  Right breast cancer 2010 with 4 chemo is an 33 radiations in the lumpectomy  5. Moderna vaccination 1/29/2021; 2/26/2021      Current Outpatient Medications on File Prior to Visit   Medication Sig Dispense Refill   • atenolol (TENORMIN) 25 MG tablet Take 0.5 tablets by mouth Daily. 90 tablet 3   • CALCIUM-MAGNESIUM-VITAMIN D PO Take  by mouth.     • Cholecalciferol 25 MCG (1000 UT) capsule Take 1,000 Units by mouth Daily.     • ibuprofen (ADVIL,MOTRIN) 800 MG tablet Take 800 mg by mouth Every 6 (Six) Hours As Needed for Mild Pain .     • vitamin B-12 (CYANOCOBALAMIN) 1000 MCG tablet Take 1,000 mcg by mouth 3 (Three) Times a Week.       No current facility-administered medications on file prior to visit.       ALLERGIES    Adriamycin [doxorubicin], Amoxicillin, and Other    Past Medical History:   Diagnosis Date   • Arthritis    • Arthritis    • Breast cancer (HCC)     right, with radiation and chemo. 2010   • Cataract    • COVID-19 vaccine administered 01/29/2021    2nd vaccine 02/26/2021 Moderna   • DDD (degenerative disc disease), lumbar    • Dermatitis    • Diverticulitis    • Drug therapy    • HTN (hypertension)    • Hx of radiation therapy 2011   • Macular degeneration    • PONV  "(postoperative nausea and vomiting)    • Vitamin B deficiency    • Vitamin D deficiency    • Wears glasses        Social History     Socioeconomic History   • Marital status:    Tobacco Use   • Smoking status: Never Smoker   • Smokeless tobacco: Never Used   Substance and Sexual Activity   • Alcohol use: Yes     Comment: social wine/beer drinker, maybe 1 drink every month or so    • Drug use: No   • Sexual activity: Defer       Family History   Problem Relation Age of Onset   • Heart failure Mother    • Brain cancer Father    • Stroke Sister    • Diabetes Brother    • Parkinsonism Sister    • Breast cancer Neg Hx    • Ovarian cancer Neg Hx    • Endometrial cancer Neg Hx        Review of Systems    Objective   BP (!) 189/103 (BP Location: Left arm)   Pulse 66   Temp 97.4 °F (36.3 °C)   Ht 167.6 cm (66\")   Wt 73 kg (161 lb)   SpO2 99%   BMI 25.99 kg/m²   Vitals:    10/04/22 1036   BP: (!) 189/103   BP Location: Left arm   Pulse: 66   Temp: 97.4 °F (36.3 °C)   SpO2: 99%   Weight: 73 kg (161 lb)   Height: 167.6 cm (66\")      Lab Results (most recent)     None        Physical Exam    Procedure   Procedures         Assessment & Plan      Diagnosis Plan   1. Essential hypertension     2. Grade I diastolic dysfunction     3. Premature atrial contraction     4. PVC (premature ventricular contraction)     5. Shortness of breath     6. Peripheral edema         No follow-ups on file.           Michelle Toure  reports that she has never smoked. She has never used smokeless tobacco.. I have educated her on the risk of diseases from using tobacco products such as {Tobacco Cessation Diseases:85953::\"cancer\",\"COPD\",\"heart disease\"}.     I advised her to quit and she is {Willing/Not Willing to Quit Tobacco Products:65531}.    I spent {Time Spent Tobacco :17579} minutes counseling the patient.         {BMI is >= 25 and <30. (Overweight) The following options were offered after discussion;:[id number]}      Electronically " signed by:

## 2022-10-04 NOTE — PROGRESS NOTES
Subjective   Michelle Toure is a 72 y.o. female     Chief Complaint   Patient presents with   • Follow-up     Hypertension        HPI    Problem list:    1.  Chest pain  1.1 stress test 9/26/19-no evidence of ischemia, post-rest EF 74%  2.  Palpitations  2.1 event monitor 9/12-9/25/19-run of SVT, PACs and PVCs  3.  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  4.  Right breast cancer 2010 with 4 chemo is an 33 radiations in the lumpectomy  5. Moderna vaccination 1/29/2021; 2/26/2021    Patient is a 72-year-old female who presents today for a follow-up.  She denies any chest pain, pressure, palpitations, fluttering, dizziness, presyncope, syncope, orthopnea, PND or edema.  She denies any shortness of breath with activity.  She stays very active.  She has orders to get blood work which she plans on doing within the next couple of weeks.    Patient brought in her blood pressure readings from home and for the most part they are very good.  They run from anywhere 108 systolic to 139 systolic and 73 diastolic to 89 diastolic.  Her heart rate ranges from the 60s to the 80s.    Current Outpatient Medications on File Prior to Visit   Medication Sig Dispense Refill   • atenolol (TENORMIN) 25 MG tablet Take 0.5 tablets by mouth Daily. 90 tablet 3   • CALCIUM-MAGNESIUM-VITAMIN D PO Take  by mouth.     • Cholecalciferol 25 MCG (1000 UT) capsule Take 1,000 Units by mouth Daily.     • ibuprofen (ADVIL,MOTRIN) 800 MG tablet Take 800 mg by mouth Every 6 (Six) Hours As Needed for Mild Pain .     • vitamin B-12 (CYANOCOBALAMIN) 1000 MCG tablet Take 1,000 mcg by  mouth 3 (Three) Times a Week.       No current facility-administered medications on file prior to visit.       ALLERGIES    Adriamycin [doxorubicin], Amoxicillin, and Other    Past Medical History:   Diagnosis Date   • Arthritis    • Arthritis    • Breast cancer (HCC)     right, with radiation and chemo. 2010   • Cataract    • COVID-19 vaccine administered 01/29/2021    2nd vaccine 02/26/2021 Moderna   • DDD (degenerative disc disease), lumbar    • Dermatitis    • Diverticulitis    • Drug therapy    • HTN (hypertension)    • Hx of radiation therapy 2011   • Macular degeneration    • PONV (postoperative nausea and vomiting)    • Vitamin B deficiency    • Vitamin D deficiency    • Wears glasses        Social History     Socioeconomic History   • Marital status:    Tobacco Use   • Smoking status: Never Smoker   • Smokeless tobacco: Never Used   Substance and Sexual Activity   • Alcohol use: Yes     Comment: social wine/beer drinker, maybe 1 drink every month or so    • Drug use: No   • Sexual activity: Defer       Family History   Problem Relation Age of Onset   • Heart failure Mother    • Brain cancer Father    • Stroke Sister    • Diabetes Brother    • Parkinsonism Sister    • Breast cancer Neg Hx    • Ovarian cancer Neg Hx    • Endometrial cancer Neg Hx        Review of Systems   Constitutional: Negative for appetite change, chills, fatigue and fever.   HENT: Negative for congestion, rhinorrhea and sore throat.    Eyes: Positive for visual disturbance (glasses ).   Respiratory: Negative for cough, chest tightness, shortness of breath and wheezing.    Cardiovascular: Negative for chest pain, palpitations and leg swelling.   Gastrointestinal: Negative for abdominal pain, blood in stool, constipation, diarrhea, nausea and vomiting.   Endocrine: Negative for cold intolerance and heat intolerance.   Genitourinary: Positive for frequency (several times a day and night ). Negative for difficulty urinating, dysuria,  "hematuria and urgency.   Musculoskeletal: Positive for arthralgias (feet , lower back , ) and back pain (lower back pain ). Negative for joint swelling, neck pain and neck stiffness.   Skin: Negative for color change, pallor, rash and wound.   Allergic/Immunologic: Negative for environmental allergies and food allergies.   Neurological: Positive for numbness (tingles ( feet ) since having Chemo from Breast cancer ). Negative for dizziness, syncope, weakness, light-headedness and headaches.   Hematological: Bruises/bleeds easily (Brusies easy ).   Psychiatric/Behavioral: Positive for sleep disturbance (hard to go and hard to stay asleep at times ).       Objective   BP (!) 161/104 (BP Location: Left arm, Patient Position: Sitting)   Pulse 66   Temp 97.4 °F (36.3 °C)   Ht 167.6 cm (66\")   Wt 73 kg (161 lb)   SpO2 99%   BMI 25.99 kg/m²   Vitals:    10/04/22 1036 10/04/22 1046   BP: (!) 189/103 (!) 161/104   BP Location: Left arm Left arm   Patient Position:  Sitting   Pulse: 66    Temp: 97.4 °F (36.3 °C)    SpO2: 99%    Weight: 73 kg (161 lb)    Height: 167.6 cm (66\")       Lab Results (most recent)     None        Physical Exam  Vitals reviewed.   Constitutional:       General: She is awake.      Appearance: Normal appearance. She is well-developed, well-groomed and overweight.   HENT:      Head: Normocephalic.   Eyes:      General: Lids are normal.      Comments: Wears glasses    Neck:      Vascular: No carotid bruit, hepatojugular reflux or JVD.   Cardiovascular:      Rate and Rhythm: Normal rate and regular rhythm.      Pulses:           Radial pulses are 2+ on the right side and 2+ on the left side.        Dorsalis pedis pulses are 2+ on the right side and 2+ on the left side.        Posterior tibial pulses are 2+ on the right side and 2+ on the left side.      Heart sounds: Normal heart sounds.   Pulmonary:      Effort: Pulmonary effort is normal.      Breath sounds: Normal breath sounds and air entry. "   Abdominal:      General: Bowel sounds are normal.      Palpations: Abdomen is soft.   Musculoskeletal:      Right lower leg: No edema.      Left lower leg: No edema.   Skin:     General: Skin is warm and dry.   Neurological:      Mental Status: She is alert and oriented to person, place, and time.   Psychiatric:         Attention and Perception: Attention and perception normal.         Mood and Affect: Mood and affect normal.         Speech: Speech normal.         Behavior: Behavior normal. Behavior is cooperative.         Thought Content: Thought content normal.         Cognition and Memory: Cognition and memory normal.         Judgment: Judgment normal.         Procedure   Procedures         Assessment & Plan      Diagnosis Plan   1. Essential hypertension     2. Grade I diastolic dysfunction     3. Premature atrial contraction     4. PVC (premature ventricular contraction)     5. Shortness of breath     6. Peripheral edema         Return in about 6 months (around 4/4/2023).    Hypertension-patient is actually doing very well on atenolol.  Her blood pressure is always elevated when she comes to the doctor's office.  She brought blood pressure readings from home and they are excellent.  Diastolic dysfunction/peripheral edema-no signs of failure.  PACs/PVCs-stable on beta-blocker.  Shortness of breath-resolved.  She will continue her medication regimen.  She will follow-up in 6 months or sooner if any changes.       Michelle JEREMÍAS Toure  reports that she has never smoked. She has never used smokeless tobacco..Advance Care Planning   ACP discussion was declined by the patient. Patient does not have an advance directive, declines further assistance. Patient did not bring med list or medicine bottles to appointment, med list has been reviewed and updated based on patient's knowledge of their meds.        Electronically signed by:

## 2022-11-23 DIAGNOSIS — I49.3 PVC (PREMATURE VENTRICULAR CONTRACTION): ICD-10-CM

## 2022-11-23 DIAGNOSIS — I49.1 PREMATURE ATRIAL CONTRACTION: ICD-10-CM

## 2022-11-23 DIAGNOSIS — I10 ESSENTIAL HYPERTENSION: ICD-10-CM

## 2022-11-23 RX ORDER — ATENOLOL 25 MG/1
TABLET ORAL
Qty: 45 TABLET | Refills: 3 | Status: SHIPPED | OUTPATIENT
Start: 2022-11-23

## 2022-11-23 NOTE — TELEPHONE ENCOUNTER
Name from pharmacy: Atenolol 25 MG Oral Tablet          Will file in chart as: atenolol (TENORMIN) 25 MG tablet    Sig: Take 1/2 (one-half) tablet by mouth once daily    Disp:  45 tablet    Refills:  0    Start: 11/23/2022    Class: Normal    Non-formulary For: Essential hypertension, Premature atrial contraction, PVC (premature ventricular contraction)    Last ordered: 1 year ago by FIDELIA Sutherland Last refill: 8/22/2022    Rx #: [id number]    Beta-Blockers Protocol Failed 11/23/2022 08:53 AM   Protocol Details  BP under 140/90 in past year    No active pregnancy on record    No positive pregnancy test in past 12 months    Recent or future visit with authorizing provider

## 2023-03-07 ENCOUNTER — TELEPHONE (OUTPATIENT)
Dept: CARDIOLOGY | Facility: CLINIC | Age: 73
End: 2023-03-07
Payer: MEDICARE

## 2023-03-07 NOTE — TELEPHONE ENCOUNTER
For the HUB to read to pt:       Due to Ashlie relocating [place] of [place], patients appointment needs to be rescheduled. If patient call back please schedule her with the provider of her preference. Thank you

## 2023-03-08 ENCOUNTER — TRANSCRIBE ORDERS (OUTPATIENT)
Dept: ADMINISTRATIVE | Facility: HOSPITAL | Age: 73
End: 2023-03-08
Payer: MEDICARE

## 2023-03-08 DIAGNOSIS — Z12.31 VISIT FOR SCREENING MAMMOGRAM: Primary | ICD-10-CM

## 2023-04-24 ENCOUNTER — HOSPITAL ENCOUNTER (OUTPATIENT)
Dept: MAMMOGRAPHY | Facility: HOSPITAL | Age: 73
Discharge: HOME | End: 2023-04-24
Admitting: INTERNAL MEDICINE
Payer: MEDICARE

## 2023-04-24 DIAGNOSIS — Z12.31 VISIT FOR SCREENING MAMMOGRAM: ICD-10-CM

## 2023-04-24 PROCEDURE — 77063 BREAST TOMOSYNTHESIS BI: CPT

## 2023-04-24 PROCEDURE — 77067 SCR MAMMO BI INCL CAD: CPT | Performed by: RADIOLOGY

## 2023-04-24 PROCEDURE — 77067 SCR MAMMO BI INCL CAD: CPT

## 2023-04-24 PROCEDURE — 77063 BREAST TOMOSYNTHESIS BI: CPT | Performed by: RADIOLOGY

## 2023-06-01 ENCOUNTER — HOSPITAL ENCOUNTER (OUTPATIENT)
Dept: MAMMOGRAPHY | Facility: HOSPITAL | Age: 73
Discharge: HOME | End: 2023-06-01

## 2023-06-01 ENCOUNTER — HOSPITAL ENCOUNTER (OUTPATIENT)
Dept: ULTRASOUND IMAGING | Facility: HOSPITAL | Age: 73
Discharge: HOME | End: 2023-06-01

## 2023-06-01 DIAGNOSIS — R92.8 ABNORMAL MAMMOGRAM: ICD-10-CM

## 2023-06-01 PROCEDURE — G0279 TOMOSYNTHESIS, MAMMO: HCPCS

## 2023-06-01 PROCEDURE — 77065 DX MAMMO INCL CAD UNI: CPT

## 2023-06-01 PROCEDURE — 76642 ULTRASOUND BREAST LIMITED: CPT

## 2023-07-26 ENCOUNTER — OFFICE VISIT (OUTPATIENT)
Dept: CARDIOLOGY | Facility: CLINIC | Age: 73
End: 2023-07-26
Payer: MEDICARE

## 2023-07-26 VITALS
DIASTOLIC BLOOD PRESSURE: 88 MMHG | BODY MASS INDEX: 25.55 KG/M2 | HEIGHT: 66 IN | SYSTOLIC BLOOD PRESSURE: 172 MMHG | OXYGEN SATURATION: 94 % | WEIGHT: 159 LBS | HEART RATE: 72 BPM

## 2023-07-26 DIAGNOSIS — I47.1 PSVT (PAROXYSMAL SUPRAVENTRICULAR TACHYCARDIA): Primary | ICD-10-CM

## 2023-07-26 DIAGNOSIS — R06.02 SHORTNESS OF BREATH: ICD-10-CM

## 2023-07-26 DIAGNOSIS — I10 ESSENTIAL HYPERTENSION: ICD-10-CM

## 2023-07-26 PROCEDURE — 3077F SYST BP >= 140 MM HG: CPT | Performed by: PHYSICIAN ASSISTANT

## 2023-07-26 PROCEDURE — 3079F DIAST BP 80-89 MM HG: CPT | Performed by: PHYSICIAN ASSISTANT

## 2023-07-26 PROCEDURE — 99213 OFFICE O/P EST LOW 20 MIN: CPT | Performed by: PHYSICIAN ASSISTANT

## 2023-07-26 NOTE — LETTER
July 27, 2023       No Recipients    Patient: Michelle Toure   YOB: 1950   Date of Visit: 7/26/2023       Dear Catalina Garg MD    Michelle Toure was in my office today. Below is a copy of my note.    If you have questions, please do not hesitate to call me. I look forward to following Michelle along with you.         Sincerely,        KWAN Sharma        CC:   No Recipients    Problem list     Subjective  Michelle Toure is a 72 y.o. female     Chief Complaint   Patient presents with   • Follow-up     6 months   Problem list:     1.  Chest pain  1.1 stress test 9/26/19-no evidence of ischemia, post-rest EF 74%  2.  Palpitations  2.1 event monitor 9/12-9/25/19-run of SVT, PACs and PVCs  3.  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  4.  Right breast cancer 2010 with 4 chemo is an 33 radiations in the lumpectomy  5. Moderna vaccination 1/29/2021; 2/26/2021    HPI    Patient is a 72-year-old male who presents back to the office for routine follow-up.  Patient does not describe any chest pain or pressure.  She does have mild amounts of dyspnea.  This has been a chronic shortness of breath.  She does not describe any progressive dyspnea at all.  No complaints of PND orthopnea.    She does not describe palpitating.  She has history of SVT as well as premature beats.  She has done well on medication.  Otherwise, she is stable.      Current Outpatient Medications on File Prior to Visit   Medication Sig Dispense Refill   • atenolol (TENORMIN) 25 MG tablet Take 1/2 (one-half) tablet by mouth once daily 45 tablet 3   • ibuprofen  (ADVIL,MOTRIN) 800 MG tablet Take 1 tablet by mouth Every 6 (Six) Hours As Needed for Mild Pain.     • CALCIUM-MAGNESIUM-VITAMIN D PO Take  by mouth.     • Cholecalciferol 25 MCG (1000 UT) capsule Take 1 capsule by mouth Daily.     • vitamin B-12 (CYANOCOBALAMIN) 1000 MCG tablet Take 1 tablet by mouth 3 (Three) Times a Week.       No current facility-administered medications on file prior to visit.       Adriamycin [doxorubicin], Amoxicillin, and Other    Past Medical History:   Diagnosis Date   • Arthritis    • Arthritis    • Breast cancer     right, with radiation and chemo. 2010   • Cataract    • COVID-19 vaccine administered 01/29/2021    2nd vaccine 02/26/2021 Moderna   • DDD (degenerative disc disease), lumbar    • Dermatitis    • Diverticulitis    • Drug therapy    • HTN (hypertension)    • Hx of radiation therapy 2011   • Macular degeneration    • PONV (postoperative nausea and vomiting)    • Vitamin B deficiency    • Vitamin D deficiency    • Wears glasses        Social History     Socioeconomic History   • Marital status:    Tobacco Use   • Smoking status: Never   • Smokeless tobacco: Never   Substance and Sexual Activity   • Alcohol use: Yes     Comment: social wine/beer drinker, maybe 1 drink every month or so    • Drug use: No   • Sexual activity: Defer       Family History   Problem Relation Age of Onset   • Heart failure Mother    • Brain cancer Father    • Stroke Sister    • Diabetes Brother    • Parkinsonism Sister    • Breast cancer Neg Hx    • Ovarian cancer Neg Hx    • Endometrial cancer Neg Hx        Review of Systems   Constitutional: Negative.    HENT: Negative.     Eyes: Negative.    Respiratory:  Positive for shortness of breath. Negative for apnea, cough, chest tightness and wheezing.    Cardiovascular: Negative.  Negative for chest pain, palpitations and leg swelling.   Gastrointestinal: Negative.  Negative for blood in stool.   Endocrine: Negative.    Genitourinary:  Negative for  "hematuria.   Musculoskeletal: Negative.    Skin: Negative.  Negative for color change, rash and wound.   Allergic/Immunologic: Negative.    Neurological:  Positive for dizziness. Negative for syncope, weakness, light-headedness, numbness and headaches.   Hematological: Negative.  Does not bruise/bleed easily.   Psychiatric/Behavioral: Negative.  Negative for sleep disturbance.      Objective  Vitals:    07/26/23 1536   BP: 172/88   BP Location: Left arm   Patient Position: Sitting   Cuff Size: Adult   Pulse: 72   SpO2: 94%   Weight: 72.1 kg (159 lb)   Height: 167.6 cm (66\")      /88 (BP Location: Left arm, Patient Position: Sitting, Cuff Size: Adult)   Pulse 72   Ht 167.6 cm (66\")   Wt 72.1 kg (159 lb)   SpO2 94%   BMI 25.66 kg/m²     Lab Results (most recent)       None            Physical Exam  Vitals and nursing note reviewed.   Constitutional:       General: She is not in acute distress.     Appearance: Normal appearance. She is well-developed.   HENT:      Head: Normocephalic and atraumatic.   Eyes:      General: No scleral icterus.        Right eye: No discharge.         Left eye: No discharge.      Conjunctiva/sclera: Conjunctivae normal.   Neck:      Vascular: No carotid bruit.   Cardiovascular:      Rate and Rhythm: Normal rate and regular rhythm.      Heart sounds: Normal heart sounds. No murmur heard.    No friction rub. No gallop.   Pulmonary:      Effort: Pulmonary effort is normal. No respiratory distress.      Breath sounds: Normal breath sounds. No wheezing or rales.   Chest:      Chest wall: No tenderness.   Musculoskeletal:      Right lower leg: No edema.      Left lower leg: No edema.   Skin:     General: Skin is warm and dry.      Coloration: Skin is not pale.      Findings: No erythema or rash.   Neurological:      Mental Status: She is alert and oriented to person, place, and time.      Cranial Nerves: No cranial nerve deficit.   Psychiatric:         Behavior: Behavior normal. "       Procedure  Procedures       Assessment & Plan    Problems Addressed this Visit          Cardiac and Vasculature    Essential hypertension    PSVT (paroxysmal supraventricular tachycardia) - Primary       Pulmonary and Pneumonias    Shortness of breath     Diagnoses         Codes Comments    PSVT (paroxysmal supraventricular tachycardia)    -  Primary ICD-10-CM: I47.1  ICD-9-CM: 427.0     Shortness of breath     ICD-10-CM: R06.02  ICD-9-CM: 786.05     Essential hypertension     ICD-10-CM: I10  ICD-9-CM: 401.9           Recommendation  1.  Patient is a 72-year-old female presenting back for follow-up with history of SVT.  She has done well.  She has done well on medical therapy and I will make no change at this time.    2.  Her hypertension is more increased today but she may have a degree of whitecoat hypertension.  For now, we will make no changes.    3.  Patient with dyspnea that is stable.  No progressive shortness of breath.  For now, we will continue to monitor.  We will see her back for follow-up in 6 months to a year or sooner as symptoms discussed.  Follow-up with primary as scheduled.         Patient brought in medicine list to appointment, it's been reviewed with patient and med list was updated in the chart.      Advance Care Planning   ACP discussion was declined by the patient. Patient does not have an advance directive, declines further assistance.      Electronically signed by:

## 2023-07-26 NOTE — PROGRESS NOTES
Problem list     Subjective   Michelle Toure is a 72 y.o. female     Chief Complaint   Patient presents with    Follow-up     6 months   Problem list:     1.  Chest pain  1.1 stress test 9/26/19-no evidence of ischemia, post-rest EF 74%  2.  Palpitations  2.1 event monitor 9/12-9/25/19-run of SVT, PACs and PVCs  3.  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  4.  Right breast cancer 2010 with 4 chemo is an 33 radiations in the lumpectomy  5. Moderna vaccination 1/29/2021; 2/26/2021    HPI    Patient is a 72-year-old male who presents back to the office for routine follow-up.  Patient does not describe any chest pain or pressure.  She does have mild amounts of dyspnea.  This has been a chronic shortness of breath.  She does not describe any progressive dyspnea at all.  No complaints of PND orthopnea.    She does not describe palpitating.  She has history of SVT as well as premature beats.  She has done well on medication.  Otherwise, she is stable.      Current Outpatient Medications on File Prior to Visit   Medication Sig Dispense Refill    atenolol (TENORMIN) 25 MG tablet Take 1/2 (one-half) tablet by mouth once daily 45 tablet 3    ibuprofen (ADVIL,MOTRIN) 800 MG tablet Take 1 tablet by mouth Every 6 (Six) Hours As Needed for Mild Pain.      CALCIUM-MAGNESIUM-VITAMIN D PO Take  by mouth.      Cholecalciferol 25 MCG (1000 UT) capsule Take 1 capsule by mouth Daily.      vitamin B-12 (CYANOCOBALAMIN) 1000 MCG tablet Take 1 tablet by mouth 3 (Three) Times a Week.       No current facility-administered medications on file prior to visit.        Adriamycin [doxorubicin], Amoxicillin, and Other    Past Medical History:   Diagnosis Date    Arthritis     Arthritis     Breast cancer     right, with radiation and chemo. 2010    Cataract     COVID-19 vaccine administered 01/29/2021    2nd vaccine 02/26/2021 Moderna    DDD (degenerative disc disease), lumbar     Dermatitis     Diverticulitis     Drug therapy     HTN (hypertension)     Hx of radiation therapy 2011    Macular degeneration     PONV (postoperative nausea and vomiting)     Vitamin B deficiency     Vitamin D deficiency     Wears glasses        Social History     Socioeconomic History    Marital status:    Tobacco Use    Smoking status: Never    Smokeless tobacco: Never   Substance and Sexual Activity    Alcohol use: Yes     Comment: social wine/beer drinker, maybe 1 drink every month or so     Drug use: No    Sexual activity: Defer       Family History   Problem Relation Age of Onset    Heart failure Mother     Brain cancer Father     Stroke Sister     Diabetes Brother     Parkinsonism Sister     Breast cancer Neg Hx     Ovarian cancer Neg Hx     Endometrial cancer Neg Hx        Review of Systems   Constitutional: Negative.    HENT: Negative.     Eyes: Negative.    Respiratory:  Positive for shortness of breath. Negative for apnea, cough, chest tightness and wheezing.    Cardiovascular: Negative.  Negative for chest pain, palpitations and leg swelling.   Gastrointestinal: Negative.  Negative for blood in stool.   Endocrine: Negative.    Genitourinary:  Negative for hematuria.   Musculoskeletal: Negative.    Skin: Negative.  Negative for color change, rash and wound.   Allergic/Immunologic: Negative.    Neurological:  Positive for dizziness. Negative for syncope, weakness, light-headedness, numbness and headaches.   Hematological: Negative.  Does not bruise/bleed easily.   Psychiatric/Behavioral: Negative.  Negative for sleep disturbance.      Objective   Vitals:    07/26/23 1536   BP:  "172/88   BP Location: Left arm   Patient Position: Sitting   Cuff Size: Adult   Pulse: 72   SpO2: 94%   Weight: 72.1 kg (159 lb)   Height: 167.6 cm (66\")      /88 (BP Location: Left arm, Patient Position: Sitting, Cuff Size: Adult)   Pulse 72   Ht 167.6 cm (66\")   Wt 72.1 kg (159 lb)   SpO2 94%   BMI 25.66 kg/m²     Lab Results (most recent)       None            Physical Exam  Vitals and nursing note reviewed.   Constitutional:       General: She is not in acute distress.     Appearance: Normal appearance. She is well-developed.   HENT:      Head: Normocephalic and atraumatic.   Eyes:      General: No scleral icterus.        Right eye: No discharge.         Left eye: No discharge.      Conjunctiva/sclera: Conjunctivae normal.   Neck:      Vascular: No carotid bruit.   Cardiovascular:      Rate and Rhythm: Normal rate and regular rhythm.      Heart sounds: Normal heart sounds. No murmur heard.    No friction rub. No gallop.   Pulmonary:      Effort: Pulmonary effort is normal. No respiratory distress.      Breath sounds: Normal breath sounds. No wheezing or rales.   Chest:      Chest wall: No tenderness.   Musculoskeletal:      Right lower leg: No edema.      Left lower leg: No edema.   Skin:     General: Skin is warm and dry.      Coloration: Skin is not pale.      Findings: No erythema or rash.   Neurological:      Mental Status: She is alert and oriented to person, place, and time.      Cranial Nerves: No cranial nerve deficit.   Psychiatric:         Behavior: Behavior normal.       Procedure   Procedures       Assessment & Plan     Problems Addressed this Visit          Cardiac and Vasculature    Essential hypertension    PSVT (paroxysmal supraventricular tachycardia) - Primary       Pulmonary and Pneumonias    Shortness of breath     Diagnoses         Codes Comments    PSVT (paroxysmal supraventricular tachycardia)    -  Primary ICD-10-CM: I47.1  ICD-9-CM: 427.0     Shortness of breath     ICD-10-CM: " R06.02  ICD-9-CM: 786.05     Essential hypertension     ICD-10-CM: I10  ICD-9-CM: 401.9           Recommendation  1.  Patient is a 72-year-old female presenting back for follow-up with history of SVT.  She has done well.  She has done well on medical therapy and I will make no change at this time.    2.  Her hypertension is more increased today but she may have a degree of whitecoat hypertension.  For now, we will make no changes.    3.  Patient with dyspnea that is stable.  No progressive shortness of breath.  For now, we will continue to monitor.  We will see her back for follow-up in 6 months to a year or sooner as symptoms discussed.  Follow-up with primary as scheduled.         Patient brought in medicine list to appointment, it's been reviewed with patient and med list was updated in the chart.      Advance Care Planning   ACP discussion was declined by the patient. Patient does not have an advance directive, declines further assistance.      Electronically signed by:

## 2023-07-27 PROBLEM — I47.10 PSVT (PAROXYSMAL SUPRAVENTRICULAR TACHYCARDIA): Status: ACTIVE | Noted: 2023-07-27

## 2023-07-27 PROBLEM — I47.1 PSVT (PAROXYSMAL SUPRAVENTRICULAR TACHYCARDIA): Status: ACTIVE | Noted: 2023-07-27

## 2024-04-30 ENCOUNTER — OFFICE VISIT (OUTPATIENT)
Dept: CARDIOLOGY | Facility: CLINIC | Age: 74
End: 2024-04-30
Payer: MEDICARE

## 2024-04-30 VITALS
WEIGHT: 162 LBS | BODY MASS INDEX: 26.03 KG/M2 | DIASTOLIC BLOOD PRESSURE: 96 MMHG | HEIGHT: 66 IN | SYSTOLIC BLOOD PRESSURE: 157 MMHG | HEART RATE: 69 BPM

## 2024-04-30 DIAGNOSIS — I34.0 MITRAL VALVE INSUFFICIENCY, UNSPECIFIED ETIOLOGY: Primary | ICD-10-CM

## 2024-04-30 DIAGNOSIS — I10 ESSENTIAL HYPERTENSION: ICD-10-CM

## 2024-04-30 DIAGNOSIS — R01.1 HEART MURMUR: ICD-10-CM

## 2024-04-30 PROCEDURE — 3080F DIAST BP >= 90 MM HG: CPT | Performed by: PHYSICIAN ASSISTANT

## 2024-04-30 PROCEDURE — 3077F SYST BP >= 140 MM HG: CPT | Performed by: PHYSICIAN ASSISTANT

## 2024-04-30 PROCEDURE — 99214 OFFICE O/P EST MOD 30 MIN: CPT | Performed by: PHYSICIAN ASSISTANT

## 2024-04-30 RX ORDER — ATENOLOL 25 MG/1
25 TABLET ORAL DAILY
COMMUNITY

## 2024-04-30 RX ORDER — HYDROCHLOROTHIAZIDE 25 MG/1
25 TABLET ORAL DAILY
COMMUNITY

## 2024-04-30 NOTE — PROGRESS NOTES
Problem list     Subjective   Michelle Toure is a 73 y.o. female     Chief Complaint   Patient presents with    Follow-up     9 months   Problem list:     1.  Chest pain  1.1 stress test 9/26/19-no evidence of ischemia, post-rest EF 74%  2.  Palpitations  2.1 event monitor 9/12-9/25/19-run of SVT, PACs and PVCs  3.  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  4.  Right breast cancer 2010 with 4 chemo is an 33 radiations in the lumpectomy  5. Moderna vaccination 1/29/2021; 2/26/2021    HPI    Patient is a 73-year-old female presenting back to the office for follow-up.  She has clinically done well.  She has no chest pain or pressure at all.  No complaints of dyspnea.  No PND or orthopnea.    She does not describe palpitating at all.  No complaints of edema.  No complaints of dizziness, presyncope or syncope.  She is feeling well otherwise.      Current Outpatient Medications on File Prior to Visit   Medication Sig Dispense Refill    atenolol (TENORMIN) 25 MG tablet Take 1 tablet by mouth Daily.      CALCIUM-MAGNESIUM-VITAMIN D PO Take  by mouth.      hydroCHLOROthiazide 25 MG tablet Take 1 tablet by mouth Daily.      ibuprofen (ADVIL,MOTRIN) 800 MG tablet Take 1 tablet by mouth Every 6 (Six) Hours As Needed for Mild Pain.      Vitamin D-Vitamin K (K2-D3 MAX PO) Take  by mouth.      [DISCONTINUED] atenolol (TENORMIN) 25 MG tablet Take 1/2 (one-half) tablet by mouth once daily 45 tablet 3    [DISCONTINUED] Cholecalciferol 25 MCG (1000 UT) capsule Take 1 capsule by mouth Daily.      [DISCONTINUED] vitamin B-12 (CYANOCOBALAMIN) 1000 MCG tablet Take 1  tablet by mouth 3 (Three) Times a Week.       No current facility-administered medications on file prior to visit.       Adriamycin [doxorubicin], Amoxicillin, and Other    Past Medical History:   Diagnosis Date    Arthritis     Arthritis     Breast cancer     right, with radiation and chemo. 2010    Cataract     COVID-19 vaccine administered 01/29/2021    2nd vaccine 02/26/2021 Moderna    DDD (degenerative disc disease), lumbar     Dermatitis     Diverticulitis     Drug therapy     HTN (hypertension)     Hx of radiation therapy 2011    Macular degeneration     PONV (postoperative nausea and vomiting)     Vitamin B deficiency     Vitamin D deficiency     Wears glasses        Social History     Socioeconomic History    Marital status:    Tobacco Use    Smoking status: Never    Smokeless tobacco: Never   Substance and Sexual Activity    Alcohol use: Yes     Comment: social wine/beer drinker, maybe 1 drink every month or so     Drug use: No    Sexual activity: Defer       Family History   Problem Relation Age of Onset    Heart failure Mother     Brain cancer Father     Stroke Sister     Diabetes Brother     Parkinsonism Sister     Breast cancer Neg Hx     Ovarian cancer Neg Hx     Endometrial cancer Neg Hx        Review of Systems   Constitutional: Negative.  Negative for activity change, appetite change, diaphoresis, fatigue and fever.   Eyes: Negative.  Negative for visual disturbance.   Respiratory: Negative.  Negative for apnea, cough, chest tightness, shortness of breath and wheezing.    Cardiovascular: Negative.  Negative for chest pain, palpitations and leg swelling.   Gastrointestinal: Negative.  Negative for blood in stool.   Endocrine: Negative.  Negative for cold intolerance and heat intolerance.   Genitourinary: Negative.  Negative for hematuria.   Musculoskeletal: Negative.  Negative for gait problem.   Skin: Negative.  Negative for color change, rash and wound.   Allergic/Immunologic: Negative.   "Negative for environmental allergies and food allergies.   Neurological:  Negative for dizziness, syncope, weakness, light-headedness, numbness and headaches.   Hematological:  Bruises/bleeds easily (Bruises).   Psychiatric/Behavioral: Negative.  Negative for sleep disturbance.        Objective   Vitals:    04/30/24 1111   BP: 157/96   BP Location: Left arm   Patient Position: Sitting   Cuff Size: Adult   Pulse: 69   Weight: 73.5 kg (162 lb)   Height: 167.6 cm (66\")      /96 (BP Location: Left arm, Patient Position: Sitting, Cuff Size: Adult)   Pulse 69   Ht 167.6 cm (66\")   Wt 73.5 kg (162 lb)   BMI 26.15 kg/m²     Lab Results (most recent)       None            Physical Exam  Vitals and nursing note reviewed.   Constitutional:       General: She is not in acute distress.     Appearance: Normal appearance. She is well-developed.   HENT:      Head: Normocephalic and atraumatic.   Eyes:      General: No scleral icterus.        Right eye: No discharge.         Left eye: No discharge.      Conjunctiva/sclera: Conjunctivae normal.   Neck:      Vascular: No carotid bruit.   Cardiovascular:      Rate and Rhythm: Normal rate and regular rhythm.      Heart sounds: Normal heart sounds. No murmur heard.     Systolic murmur is present with a grade of 1/6.      No friction rub. No gallop.      Comments: Grade 1/6 systolic murmur right upper sternal border  Pulmonary:      Effort: Pulmonary effort is normal. No respiratory distress.      Breath sounds: Normal breath sounds. No wheezing or rales.   Chest:      Chest wall: No tenderness.   Musculoskeletal:      Right lower leg: No edema.      Left lower leg: No edema.   Skin:     General: Skin is warm and dry.      Coloration: Skin is not pale.      Findings: No erythema or rash.   Neurological:      Mental Status: She is alert and oriented to person, place, and time.      Cranial Nerves: No cranial nerve deficit.   Psychiatric:         Behavior: Behavior normal. "         Procedure   Procedures       Assessment & Plan     Problems Addressed this Visit          Cardiac and Vasculature    Essential hypertension    Relevant Medications    atenolol (TENORMIN) 25 MG tablet    hydroCHLOROthiazide 25 MG tablet    Other Relevant Orders    Adult Transthoracic Echo Complete W/ Cont if Necessary Per Protocol     Other Visit Diagnoses       Mitral valve insufficiency, unspecified etiology    -  Primary    Relevant Medications    atenolol (TENORMIN) 25 MG tablet    Other Relevant Orders    Adult Transthoracic Echo Complete W/ Cont if Necessary Per Protocol    Heart murmur        Relevant Orders    Adult Transthoracic Echo Complete W/ Cont if Necessary Per Protocol          Diagnoses         Codes Comments    Mitral valve insufficiency, unspecified etiology    -  Primary ICD-10-CM: I34.0  ICD-9-CM: 424.0     Heart murmur     ICD-10-CM: R01.1  ICD-9-CM: 785.2     Essential hypertension     ICD-10-CM: I10  ICD-9-CM: 401.9             Recommendation  1.  Patient is a 73-year-old female presenting back for routine assessment.  She has mitral valve disease that is trivial to mild by echocardiogram almost 5 years ago.  She does have murmur on exam and is interested on repeating echo.  We will schedule.    2.  Patient with baseline hypertension.  Her medications were recently increased and have reviewed blood pressure readings which are much better than what it is listed today.  We can monitor for now.    3.  We will see her back for follow-up in a year or sooner pending echocardiogram findings.  Follow-up with primary as scheduled.         Patient did not bring med list or medicine bottles to appointment, med list has been reviewed and updated based on patient's knowledge of their meds.      Advance Care Planning   ACP discussion was declined by the patient. Patient does not have an advance directive, declines further assistance.      Electronically signed by:

## 2024-04-30 NOTE — LETTER
April 30, 2024       No Recipients    Patient: Michelle Toure   YOB: 1950   Date of Visit: 4/30/2024       Dear Catalina Garg MD    Michelle Toure was in my office today. Below is a copy of my note.    If you have questions, please do not hesitate to call me. I look forward to following Michelle along with you.         Sincerely,        KWAN Sharma        CC:   No Recipients    Problem list     Subjective  Michelle Toure is a 73 y.o. female     Chief Complaint   Patient presents with   • Follow-up     9 months   Problem list:     1.  Chest pain  1.1 stress test 9/26/19-no evidence of ischemia, post-rest EF 74%  2.  Palpitations  2.1 event monitor 9/12-9/25/19-run of SVT, PACs and PVCs  3.  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  4.  Right breast cancer 2010 with 4 chemo is an 33 radiations in the lumpectomy  5. Moderna vaccination 1/29/2021; 2/26/2021    HPI    Patient is a 73-year-old female presenting back to the office for follow-up.  She has clinically done well.  She has no chest pain or pressure at all.  No complaints of dyspnea.  No PND or orthopnea.    She does not describe palpitating at all.  No complaints of edema.  No complaints of dizziness, presyncope or syncope.  She is feeling well otherwise.      Current Outpatient Medications on File Prior to Visit   Medication Sig Dispense Refill   • atenolol (TENORMIN) 25 MG tablet Take 1 tablet by mouth Daily.     • CALCIUM-MAGNESIUM-VITAMIN D PO Take  by mouth.     • hydroCHLOROthiazide 25 MG tablet Take 1 tablet by mouth Daily.     • ibuprofen (ADVIL,MOTRIN)  800 MG tablet Take 1 tablet by mouth Every 6 (Six) Hours As Needed for Mild Pain.     • Vitamin D-Vitamin K (K2-D3 MAX PO) Take  by mouth.     • [DISCONTINUED] atenolol (TENORMIN) 25 MG tablet Take 1/2 (one-half) tablet by mouth once daily 45 tablet 3   • [DISCONTINUED] Cholecalciferol 25 MCG (1000 UT) capsule Take 1 capsule by mouth Daily.     • [DISCONTINUED] vitamin B-12 (CYANOCOBALAMIN) 1000 MCG tablet Take 1 tablet by mouth 3 (Three) Times a Week.       No current facility-administered medications on file prior to visit.       Adriamycin [doxorubicin], Amoxicillin, and Other    Past Medical History:   Diagnosis Date   • Arthritis    • Arthritis    • Breast cancer     right, with radiation and chemo. 2010   • Cataract    • COVID-19 vaccine administered 01/29/2021    2nd vaccine 02/26/2021 Moderna   • DDD (degenerative disc disease), lumbar    • Dermatitis    • Diverticulitis    • Drug therapy    • HTN (hypertension)    • Hx of radiation therapy 2011   • Macular degeneration    • PONV (postoperative nausea and vomiting)    • Vitamin B deficiency    • Vitamin D deficiency    • Wears glasses        Social History     Socioeconomic History   • Marital status:    Tobacco Use   • Smoking status: Never   • Smokeless tobacco: Never   Substance and Sexual Activity   • Alcohol use: Yes     Comment: social wine/beer drinker, maybe 1 drink every month or so    • Drug use: No   • Sexual activity: Defer       Family History   Problem Relation Age of Onset   • Heart failure Mother    • Brain cancer Father    • Stroke Sister    • Diabetes Brother    • Parkinsonism Sister    • Breast cancer Neg Hx    • Ovarian cancer Neg Hx    • Endometrial cancer Neg Hx        Review of Systems   Constitutional: Negative.  Negative for activity change, appetite change, diaphoresis, fatigue and fever.   Eyes: Negative.  Negative for visual disturbance.   Respiratory: Negative.  Negative for apnea, cough, chest tightness, shortness of  "breath and wheezing.    Cardiovascular: Negative.  Negative for chest pain, palpitations and leg swelling.   Gastrointestinal: Negative.  Negative for blood in stool.   Endocrine: Negative.  Negative for cold intolerance and heat intolerance.   Genitourinary: Negative.  Negative for hematuria.   Musculoskeletal: Negative.  Negative for gait problem.   Skin: Negative.  Negative for color change, rash and wound.   Allergic/Immunologic: Negative.  Negative for environmental allergies and food allergies.   Neurological:  Negative for dizziness, syncope, weakness, light-headedness, numbness and headaches.   Hematological:  Bruises/bleeds easily (Bruises).   Psychiatric/Behavioral: Negative.  Negative for sleep disturbance.        Objective  Vitals:    04/30/24 1111   BP: 157/96   BP Location: Left arm   Patient Position: Sitting   Cuff Size: Adult   Pulse: 69   Weight: 73.5 kg (162 lb)   Height: 167.6 cm (66\")      /96 (BP Location: Left arm, Patient Position: Sitting, Cuff Size: Adult)   Pulse 69   Ht 167.6 cm (66\")   Wt 73.5 kg (162 lb)   BMI 26.15 kg/m²     Lab Results (most recent)       None            Physical Exam  Vitals and nursing note reviewed.   Constitutional:       General: She is not in acute distress.     Appearance: Normal appearance. She is well-developed.   HENT:      Head: Normocephalic and atraumatic.   Eyes:      General: No scleral icterus.        Right eye: No discharge.         Left eye: No discharge.      Conjunctiva/sclera: Conjunctivae normal.   Neck:      Vascular: No carotid bruit.   Cardiovascular:      Rate and Rhythm: Normal rate and regular rhythm.      Heart sounds: Normal heart sounds. No murmur heard.     Systolic murmur is present with a grade of 1/6.      No friction rub. No gallop.      Comments: Grade 1/6 systolic murmur right upper sternal border  Pulmonary:      Effort: Pulmonary effort is normal. No respiratory distress.      Breath sounds: Normal breath sounds. No " wheezing or rales.   Chest:      Chest wall: No tenderness.   Musculoskeletal:      Right lower leg: No edema.      Left lower leg: No edema.   Skin:     General: Skin is warm and dry.      Coloration: Skin is not pale.      Findings: No erythema or rash.   Neurological:      Mental Status: She is alert and oriented to person, place, and time.      Cranial Nerves: No cranial nerve deficit.   Psychiatric:         Behavior: Behavior normal.         Procedure  Procedures       Assessment & Plan    Problems Addressed this Visit          Cardiac and Vasculature    Essential hypertension    Relevant Medications    atenolol (TENORMIN) 25 MG tablet    hydroCHLOROthiazide 25 MG tablet    Other Relevant Orders    Adult Transthoracic Echo Complete W/ Cont if Necessary Per Protocol     Other Visit Diagnoses       Mitral valve insufficiency, unspecified etiology    -  Primary    Relevant Medications    atenolol (TENORMIN) 25 MG tablet    Other Relevant Orders    Adult Transthoracic Echo Complete W/ Cont if Necessary Per Protocol    Heart murmur        Relevant Orders    Adult Transthoracic Echo Complete W/ Cont if Necessary Per Protocol          Diagnoses         Codes Comments    Mitral valve insufficiency, unspecified etiology    -  Primary ICD-10-CM: I34.0  ICD-9-CM: 424.0     Heart murmur     ICD-10-CM: R01.1  ICD-9-CM: 785.2     Essential hypertension     ICD-10-CM: I10  ICD-9-CM: 401.9             Recommendation  1.  Patient is a 73-year-old female presenting back for routine assessment.  She has mitral valve disease that is trivial to mild by echocardiogram almost 5 years ago.  She does have murmur on exam and is interested on repeating echo.  We will schedule.    2.  Patient with baseline hypertension.  Her medications were recently increased and have reviewed blood pressure readings which are much better than what it is listed today.  We can monitor for now.    3.  We will see her back for follow-up in a year or sooner  pending echocardiogram findings.  Follow-up with primary as scheduled.         Patient did not bring med list or medicine bottles to appointment, med list has been reviewed and updated based on patient's knowledge of their meds.      Advance Care Planning  ACP discussion was declined by the patient. Patient does not have an advance directive, declines further assistance.      Electronically signed by:

## 2024-05-03 ENCOUNTER — TRANSCRIBE ORDERS (OUTPATIENT)
Dept: ADMINISTRATIVE | Facility: HOSPITAL | Age: 74
End: 2024-05-03
Payer: MEDICARE

## 2024-05-03 DIAGNOSIS — Z12.31 VISIT FOR SCREENING MAMMOGRAM: Primary | ICD-10-CM

## 2024-05-21 ENCOUNTER — HOSPITAL ENCOUNTER (OUTPATIENT)
Dept: CARDIOLOGY | Facility: HOSPITAL | Age: 74
Discharge: HOME | End: 2024-05-21
Payer: MEDICARE

## 2024-05-21 DIAGNOSIS — R01.1 HEART MURMUR: ICD-10-CM

## 2024-05-21 DIAGNOSIS — I34.0 MITRAL VALVE INSUFFICIENCY, UNSPECIFIED ETIOLOGY: ICD-10-CM

## 2024-05-21 DIAGNOSIS — I10 ESSENTIAL HYPERTENSION: ICD-10-CM

## 2024-05-21 PROCEDURE — 93306 TTE W/DOPPLER COMPLETE: CPT

## 2024-05-26 NOTE — BRIEF OP NOTE
BREAST IMPLANT REVISION AND/OR REMOVAL  Progress Note    Michelle Toure  10/13/2020    Pre-op Diagnosis:   Bilateral textured breast implants       Post-Op Diagnosis Codes:  Same  Ruptured left breast implant    Procedure/CPT® Codes:        Procedure(s):  BREAST IMPLANT REMOVAL BILATERAL    Surgeon(s):  Albert Garces MD    Anesthesia: General    Staff:   Circulator: Yue Delaney RN; Marlen Clifford RN  Scrub Person: Erica Capps  Assistant: Sima Ortez PA  Assistant: Sima Ortez PA      Estimated Blood Loss: minimal    Urine Voided: * No values recorded between 10/13/2020  1:11 PM and 10/13/2020  2:06 PM *    Specimens:                None          Drains: * No LDAs found *    Findings: ruptured left breast implant     Complications: none immediate     Assistant: Sima Ortez PA  was responsible for performing the following activities: Suturing and their skilled assistance was necessary for the success of this case.    Albert Garces MD     Date: 10/13/2020  Time: 14:08 EDT       Orthopedics Orthopedics Orthopedics Orthopedics Orthopedics Orthopedics Plastic Surgery Plastic Surgery Plastic Surgery Plastic Surgery Infectious Disease

## 2024-05-27 LAB
BH CV ECHO MEAS - ACS: 1.91 CM
BH CV ECHO MEAS - AO MAX PG: 5.6 MMHG
BH CV ECHO MEAS - AO MEAN PG: 3.4 MMHG
BH CV ECHO MEAS - AO ROOT DIAM: 4.2 CM
BH CV ECHO MEAS - AO V2 MAX: 118.1 CM/SEC
BH CV ECHO MEAS - AO V2 VTI: 28.7 CM
BH CV ECHO MEAS - EDV(CUBED): 91.1 ML
BH CV ECHO MEAS - EDV(MOD-SP4): 58.6 ML
BH CV ECHO MEAS - EF(MOD-SP4): 58 %
BH CV ECHO MEAS - EF_3D-VOL: 63 %
BH CV ECHO MEAS - ESV(CUBED): 26.5 ML
BH CV ECHO MEAS - ESV(MOD-SP4): 24.6 ML
BH CV ECHO MEAS - FS: 33.8 %
BH CV ECHO MEAS - IVS/LVPW: 1.02 CM
BH CV ECHO MEAS - IVSD: 1.27 CM
BH CV ECHO MEAS - LA DIMENSION: 3.4 CM
BH CV ECHO MEAS - LAT PEAK E' VEL: 5.3 CM/SEC
BH CV ECHO MEAS - LV DIASTOLIC VOL/BSA (35-75): 32 CM2
BH CV ECHO MEAS - LV MASS(C)D: 211.4 GRAMS
BH CV ECHO MEAS - LV SYSTOLIC VOL/BSA (12-30): 13.5 CM2
BH CV ECHO MEAS - LVIDD: 4.5 CM
BH CV ECHO MEAS - LVIDS: 3 CM
BH CV ECHO MEAS - LVPWD: 1.24 CM
BH CV ECHO MEAS - MED PEAK E' VEL: 4.2 CM/SEC
BH CV ECHO MEAS - MV A MAX VEL: 87.4 CM/SEC
BH CV ECHO MEAS - MV DEC SLOPE: 453.8 CM/SEC2
BH CV ECHO MEAS - MV E MAX VEL: 82.3 CM/SEC
BH CV ECHO MEAS - MV E/A: 0.94
BH CV ECHO MEAS - RVDD: 3.1 CM
BH CV ECHO MEAS - SV(MOD-SP4): 34 ML
BH CV ECHO MEAS - SVI(MOD-SP4): 18.6 ML/M2
BH CV ECHO MEASUREMENTS AVERAGE E/E' RATIO: 17.33
LEFT ATRIUM VOLUME INDEX: 18.7 ML/M2

## 2024-05-31 ENCOUNTER — TELEPHONE (OUTPATIENT)
Dept: CARDIOLOGY | Facility: CLINIC | Age: 74
End: 2024-05-31
Payer: MEDICARE

## 2024-05-31 NOTE — TELEPHONE ENCOUNTER
Pt notified of no acute findings. Provider will discuss results at f/u. Pt reminded of appt date and time.      Jimenez Mayer PA [place], Kendra, MA  Routine follow-up

## 2024-06-19 NOTE — PERIOPERATIVE NURSING NOTE
Ice pack applied to left breast incisionaL AREA   Upon observation possible short lingual frenulum noted. Explained to patient signs and symptoms of improper milk transfer. Discussed proper tongue movement and proper comfort of latch. Educated that IBCLC can not diagnose a short lingual frenulum and provided patient with physician handout for further evaluation.     Pt. Stated she has no questions or concerns for lactation at this time.

## 2024-06-28 ENCOUNTER — HOSPITAL ENCOUNTER (OUTPATIENT)
Dept: MAMMOGRAPHY | Facility: HOSPITAL | Age: 74
Discharge: HOME | End: 2024-06-28
Admitting: INTERNAL MEDICINE
Payer: MEDICARE

## 2024-06-28 DIAGNOSIS — Z12.31 VISIT FOR SCREENING MAMMOGRAM: ICD-10-CM

## 2024-06-28 PROCEDURE — 77067 SCR MAMMO BI INCL CAD: CPT

## 2024-06-28 PROCEDURE — 77063 BREAST TOMOSYNTHESIS BI: CPT

## 2025-04-30 ENCOUNTER — OFFICE VISIT (OUTPATIENT)
Dept: CARDIOLOGY | Facility: CLINIC | Age: 75
End: 2025-04-30
Payer: MEDICARE

## 2025-04-30 VITALS
OXYGEN SATURATION: 98 % | BODY MASS INDEX: 26.68 KG/M2 | HEART RATE: 66 BPM | SYSTOLIC BLOOD PRESSURE: 168 MMHG | HEIGHT: 66 IN | DIASTOLIC BLOOD PRESSURE: 94 MMHG | WEIGHT: 166 LBS

## 2025-04-30 DIAGNOSIS — I34.0 MITRAL VALVE INSUFFICIENCY, UNSPECIFIED ETIOLOGY: ICD-10-CM

## 2025-04-30 DIAGNOSIS — I10 ESSENTIAL HYPERTENSION: ICD-10-CM

## 2025-04-30 DIAGNOSIS — I47.10 PSVT (PAROXYSMAL SUPRAVENTRICULAR TACHYCARDIA): Primary | ICD-10-CM

## 2025-04-30 PROCEDURE — 99213 OFFICE O/P EST LOW 20 MIN: CPT | Performed by: PHYSICIAN ASSISTANT

## 2025-04-30 PROCEDURE — 93000 ELECTROCARDIOGRAM COMPLETE: CPT | Performed by: PHYSICIAN ASSISTANT

## 2025-04-30 PROCEDURE — 3080F DIAST BP >= 90 MM HG: CPT | Performed by: PHYSICIAN ASSISTANT

## 2025-04-30 PROCEDURE — 3077F SYST BP >= 140 MM HG: CPT | Performed by: PHYSICIAN ASSISTANT

## 2025-04-30 RX ORDER — DIPHENOXYLATE HYDROCHLORIDE AND ATROPINE SULFATE 2.5; .025 MG/1; MG/1
TABLET ORAL DAILY
COMMUNITY

## 2025-04-30 RX ORDER — GINKGO BILOBA 60 MG
TABLET ORAL
COMMUNITY

## 2025-04-30 NOTE — PROGRESS NOTES
Problem list     Subjective   Michelle Toure is a 74 y.o. female     Chief Complaint   Patient presents with   • Yearly follow up     Mitral valve insufficiency, unspecified etiology     Problem list:     1.  Chest pain  1.1 stress test 9/26/19-no evidence of ischemia, post-rest EF 74%  2.  Palpitations  2.1 event monitor 9/12-9/25/19-run of SVT, PACs and PVCs  3.  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  4.  Right breast cancer 2010 with 4 chemo is an 33 radiations in the lumpectomy  5. Moderna vaccination 1/29/2021; 2/26/2021     HPI    Patient is a 74-year-old female senting to the office to be evaluated.  She has done well.  She does not experience any chest pain nor does she complain of any shortness of breath.  No complaints of PND, orthopnea, or edema.    She does not describe palpitating nor does complain of dysrhythmic symptoms.    She had echocardiogram in May 2024 showing preserved systolic function with trivial valve disease.  Dilated aortic root at 4.2 cm without aneurysmal or dissecting segment.  She is stable otherwise.      Current Outpatient Medications on File Prior to Visit   Medication Sig Dispense Refill   • atenolol (TENORMIN) 25 MG tablet Take 1 tablet by mouth Daily.     • Collagen-Vitamin C-Biotin (COLLAGEN PO) Take  by mouth.     • Ginkgo 60 MG tablet Take  by mouth.     • hydroCHLOROthiazide 25 MG tablet Take 1 tablet by mouth Daily.     • ibuprofen (ADVIL,MOTRIN) 800 MG tablet Take 1 tablet by mouth Every 6 (Six) Hours As Needed for Mild Pain.     • Magnesium Hydroxide (MAGNESIA PO) Take  by mouth.     •  multivitamin (MULTIVITAMIN PO) Take  by mouth Daily.     • NON FORMULARY calcium     • [DISCONTINUED] CALCIUM-MAGNESIUM-VITAMIN D PO Take  by mouth.     • [DISCONTINUED] Vitamin D-Vitamin K (K2-D3 MAX PO) Take  by mouth.       No current facility-administered medications on file prior to visit.       Adriamycin [doxorubicin], Amoxicillin, and Other    Past Medical History:   Diagnosis Date   • Arthritis    • Arthritis    • Breast cancer     right, with radiation and chemo. 2010   • Cataract    • COVID-19 vaccine administered 01/29/2021    2nd vaccine 02/26/2021 Moderna   • DDD (degenerative disc disease), lumbar    • Dermatitis    • Diverticulitis    • Drug therapy    • HTN (hypertension)    • Hx of radiation therapy 2011   • Macular degeneration    • PONV (postoperative nausea and vomiting)    • Vitamin B deficiency    • Vitamin D deficiency    • Wears glasses        Social History     Socioeconomic History   • Marital status:    Tobacco Use   • Smoking status: Never   • Smokeless tobacco: Never   Substance and Sexual Activity   • Alcohol use: Yes     Comment: social wine/beer drinker, maybe 1 drink every month or so    • Drug use: No   • Sexual activity: Defer       Family History   Problem Relation Age of Onset   • Heart failure Mother    • Brain cancer Father    • Stroke Sister    • Diabetes Brother    • Parkinsonism Sister    • Breast cancer Neg Hx    • Ovarian cancer Neg Hx    • Endometrial cancer Neg Hx        Review of Systems   Constitutional:  Negative for activity change, appetite change, chills, fatigue and fever.   HENT: Negative.  Negative for congestion, sinus pressure and sinus pain.    Eyes: Negative.  Negative for visual disturbance.   Respiratory: Negative.  Negative for apnea, cough, chest tightness, shortness of breath and wheezing.    Cardiovascular: Negative.  Negative for chest pain, palpitations and leg swelling.   Gastrointestinal: Negative.  Negative for blood in stool.   Endocrine:  "Negative.  Negative for cold intolerance and heat intolerance.   Genitourinary: Negative.  Negative for hematuria.   Musculoskeletal: Negative.  Negative for gait problem.   Skin: Negative.  Negative for color change, rash and wound.   Allergic/Immunologic: Negative.  Negative for environmental allergies and food allergies.   Neurological: Negative.  Negative for dizziness, syncope, weakness, light-headedness, numbness and headaches.   Hematological: Negative.  Does not bruise/bleed easily.   Psychiatric/Behavioral: Negative.  Negative for sleep disturbance.        Objective   Vitals:    04/30/25 1101   BP: 168/94   BP Location: Left arm   Patient Position: Sitting   Cuff Size: Adult   Pulse: 66   SpO2: 98%   Weight: 75.3 kg (166 lb)   Height: 167.6 cm (66\")      /94 (BP Location: Left arm, Patient Position: Sitting, Cuff Size: Adult)   Pulse 66   Ht 167.6 cm (66\")   Wt 75.3 kg (166 lb)   SpO2 98%   BMI 26.79 kg/m²     Lab Results (most recent)       None            Physical Exam  Vitals and nursing note reviewed.   Constitutional:       General: She is not in acute distress.     Appearance: Normal appearance. She is well-developed.   HENT:      Head: Normocephalic and atraumatic.   Eyes:      General: No scleral icterus.        Right eye: No discharge.         Left eye: No discharge.      Conjunctiva/sclera: Conjunctivae normal.   Neck:      Vascular: No carotid bruit.   Cardiovascular:      Rate and Rhythm: Normal rate and regular rhythm.      Heart sounds: Normal heart sounds. No murmur heard.     No friction rub. No gallop.   Pulmonary:      Effort: Pulmonary effort is normal. No respiratory distress.      Breath sounds: Normal breath sounds. No wheezing or rales.   Chest:      Chest wall: No tenderness.   Musculoskeletal:      Right lower leg: No edema.      Left lower leg: No edema.   Skin:     General: Skin is warm and dry.      Coloration: Skin is not pale.      Findings: No erythema or rash. "   Neurological:      Mental Status: She is alert and oriented to person, place, and time.      Cranial Nerves: No cranial nerve deficit.   Psychiatric:         Behavior: Behavior normal.       Procedure     ECG 12 Lead    Date/Time: 4/30/2025 11:04 AM  Performed by: Jimenez Mayer PA    Authorized by: Jimenez Mayer PA  Comparison: not compared with previous ECG   Comments: Sinus rhythm at 65 bpm with no acute ST changes           Assessment & Plan     Problems Addressed this Visit          Cardiac and Vasculature    Essential hypertension    PSVT (paroxysmal supraventricular tachycardia) - Primary     Other Visit Diagnoses         Mitral valve insufficiency, unspecified etiology              Diagnoses         Codes Comments      PSVT (paroxysmal supraventricular tachycardia)    -  Primary ICD-10-CM: I47.10  ICD-9-CM: 427.0       Essential hypertension     ICD-10-CM: I10  ICD-9-CM: 401.9       Mitral valve insufficiency, unspecified etiology     ICD-10-CM: I34.0  ICD-9-CM: 424.0             Recommendations  1.  Patient is a 74-year-old female presenting for evaluation with history of SVT but not symptomatic.  She is on atenolol.  We can monitor for now.    2.  Patient with no complaints of angina or failure symptoms.  We can continue to monitor.    3.  Blood pressure runs much better at home.  She brings in blood pressure readings in which they are well-controlled.  I will make no changes.    4.  Patient with trivial valve disease.  Nothing further, but we can continue to monitor in the future with echocardiograms.  We can see her back for follow-up in a year or sooner if needed.  Follow-up with primary as scheduled.           Michelle JEREMÍAS Toure  reports that she has never smoked. She has never used smokeless tobacco.     Advance Care Planning   ACP discussion was declined by the patient. Patient does not have an advance directive, declines further assistance.        Electronically signed by:

## 2025-04-30 NOTE — LETTER
April 30, 2025     Catalina Garg MD  [address] [facility] [place] [address]    Patient: Michelle Toure   YOB: 1950   Date of Visit: 4/30/2025       Dear Catalina Garg MD    Michelle Toure was in my office today. Below is a copy of my note.    If you have questions, please do not hesitate to call me. I look forward to following Michelle along with you.         Sincerely,        KWAN Sharma        CC: No Recipients    Problem list     Subjective  Michelle Toure is a 74 y.o. female     Chief Complaint   Patient presents with   • Yearly follow up     Mitral valve insufficiency, unspecified etiology     Problem list:     1.  Chest pain  1.1 stress test 9/26/19-no evidence of ischemia, post-rest EF 74%  2.  Palpitations  2.1 event monitor 9/12-9/25/19-run of SVT, PACs and PVCs  3.  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  4.  Right breast cancer 2010 with 4 chemo is an 33 radiations in the lumpectomy  5. Moderna vaccination 1/29/2021; 2/26/2021     HPI    Patient is a 74-year-old female senting to the office to be evaluated.  She has done well.  She does not experience any chest pain nor does she complain of any shortness of breath.  No complaints of PND, orthopnea, or edema.    She does not describe palpitating nor does complain of dysrhythmic symptoms.    She had echocardiogram in May 2024 showing preserved systolic function with trivial valve disease.  Dilated aortic root at 4.2 cm without aneurysmal or dissecting segment.  She is stable otherwise.      Current Outpatient Medications on File Prior to Visit   Medication  Sig Dispense Refill   • atenolol (TENORMIN) 25 MG tablet Take 1 tablet by mouth Daily.     • Collagen-Vitamin C-Biotin (COLLAGEN PO) Take  by mouth.     • Ginkgo 60 MG tablet Take  by mouth.     • hydroCHLOROthiazide 25 MG tablet Take 1 tablet by mouth Daily.     • ibuprofen (ADVIL,MOTRIN) 800 MG tablet Take 1 tablet by mouth Every 6 (Six) Hours As Needed for Mild Pain.     • Magnesium Hydroxide (MAGNESIA PO) Take  by mouth.     • multivitamin (MULTIVITAMIN PO) Take  by mouth Daily.     • NON FORMULARY calcium     • [DISCONTINUED] CALCIUM-MAGNESIUM-VITAMIN D PO Take  by mouth.     • [DISCONTINUED] Vitamin D-Vitamin K (K2-D3 MAX PO) Take  by mouth.       No current facility-administered medications on file prior to visit.       Adriamycin [doxorubicin], Amoxicillin, and Other    Past Medical History:   Diagnosis Date   • Arthritis    • Arthritis    • Breast cancer     right, with radiation and chemo. 2010   • Cataract    • COVID-19 vaccine administered 01/29/2021    2nd vaccine 02/26/2021 Moderna   • DDD (degenerative disc disease), lumbar    • Dermatitis    • Diverticulitis    • Drug therapy    • HTN (hypertension)    • Hx of radiation therapy 2011   • Macular degeneration    • PONV (postoperative nausea and vomiting)    • Vitamin B deficiency    • Vitamin D deficiency    • Wears glasses        Social History     Socioeconomic History   • Marital status:    Tobacco Use   • Smoking status: Never   • Smokeless tobacco: Never   Substance and Sexual Activity   • Alcohol use: Yes     Comment: social wine/beer drinker, maybe 1 drink every month or so    • Drug use: No   • Sexual activity: Defer       Family History   Problem Relation Age of Onset   • Heart failure Mother    • Brain cancer Father    • Stroke Sister    • Diabetes Brother    • Parkinsonism Sister    • Breast cancer Neg Hx    • Ovarian cancer Neg Hx    • Endometrial cancer Neg Hx        Review of Systems   Constitutional:  Negative for activity change,  "appetite change, chills, fatigue and fever.   HENT: Negative.  Negative for congestion, sinus pressure and sinus pain.    Eyes: Negative.  Negative for visual disturbance.   Respiratory: Negative.  Negative for apnea, cough, chest tightness, shortness of breath and wheezing.    Cardiovascular: Negative.  Negative for chest pain, palpitations and leg swelling.   Gastrointestinal: Negative.  Negative for blood in stool.   Endocrine: Negative.  Negative for cold intolerance and heat intolerance.   Genitourinary: Negative.  Negative for hematuria.   Musculoskeletal: Negative.  Negative for gait problem.   Skin: Negative.  Negative for color change, rash and wound.   Allergic/Immunologic: Negative.  Negative for environmental allergies and food allergies.   Neurological: Negative.  Negative for dizziness, syncope, weakness, light-headedness, numbness and headaches.   Hematological: Negative.  Does not bruise/bleed easily.   Psychiatric/Behavioral: Negative.  Negative for sleep disturbance.        Objective  Vitals:    04/30/25 1101   BP: 168/94   BP Location: Left arm   Patient Position: Sitting   Cuff Size: Adult   Pulse: 66   SpO2: 98%   Weight: 75.3 kg (166 lb)   Height: 167.6 cm (66\")      /94 (BP Location: Left arm, Patient Position: Sitting, Cuff Size: Adult)   Pulse 66   Ht 167.6 cm (66\")   Wt 75.3 kg (166 lb)   SpO2 98%   BMI 26.79 kg/m²     Lab Results (most recent)       None            Physical Exam  Vitals and nursing note reviewed.   Constitutional:       General: She is not in acute distress.     Appearance: Normal appearance. She is well-developed.   HENT:      Head: Normocephalic and atraumatic.   Eyes:      General: No scleral icterus.        Right eye: No discharge.         Left eye: No discharge.      Conjunctiva/sclera: Conjunctivae normal.   Neck:      Vascular: No carotid bruit.   Cardiovascular:      Rate and Rhythm: Normal rate and regular rhythm.      Heart sounds: Normal heart sounds. " No murmur heard.     No friction rub. No gallop.   Pulmonary:      Effort: Pulmonary effort is normal. No respiratory distress.      Breath sounds: Normal breath sounds. No wheezing or rales.   Chest:      Chest wall: No tenderness.   Musculoskeletal:      Right lower leg: No edema.      Left lower leg: No edema.   Skin:     General: Skin is warm and dry.      Coloration: Skin is not pale.      Findings: No erythema or rash.   Neurological:      Mental Status: She is alert and oriented to person, place, and time.      Cranial Nerves: No cranial nerve deficit.   Psychiatric:         Behavior: Behavior normal.       Procedure    ECG 12 Lead    Date/Time: 4/30/2025 11:04 AM  Performed by: Jimenez Mayer PA    Authorized by: Jimenez Mayer PA  Comparison: not compared with previous ECG   Comments: Sinus rhythm at 65 bpm with no acute ST changes           Assessment & Plan    Problems Addressed this Visit          Cardiac and Vasculature    Essential hypertension    PSVT (paroxysmal supraventricular tachycardia) - Primary     Other Visit Diagnoses         Mitral valve insufficiency, unspecified etiology              Diagnoses         Codes Comments      PSVT (paroxysmal supraventricular tachycardia)    -  Primary ICD-10-CM: I47.10  ICD-9-CM: 427.0       Essential hypertension     ICD-10-CM: I10  ICD-9-CM: 401.9       Mitral valve insufficiency, unspecified etiology     ICD-10-CM: I34.0  ICD-9-CM: 424.0             Recommendations  1.  Patient is a 74-year-old female presenting for evaluation with history of SVT but not symptomatic.  She is on atenolol.  We can monitor for now.    2.  Patient with no complaints of angina or failure symptoms.  We can continue to monitor.    3.  Blood pressure runs much better at home.  She brings in blood pressure readings in which they are well-controlled.  I will make no changes.    4.  Patient with trivial valve disease.  Nothing further, but we can continue to monitor in the  future with echocardiograms.  We can see her back for follow-up in a year or sooner if needed.  Follow-up with primary as scheduled.           Michelle JEREMÍAS Toure  reports that she has never smoked. She has never used smokeless tobacco.     Advance Care Planning  ACP discussion was declined by the patient. Patient does not have an advance directive, declines further assistance.        Electronically signed by:

## 2025-05-14 ENCOUNTER — TRANSCRIBE ORDERS (OUTPATIENT)
Dept: ADMINISTRATIVE | Facility: HOSPITAL | Age: 75
End: 2025-05-14
Payer: MEDICARE

## 2025-05-14 DIAGNOSIS — Z12.31 VISIT FOR SCREENING MAMMOGRAM: Primary | ICD-10-CM

## 2025-06-30 ENCOUNTER — HOSPITAL ENCOUNTER (OUTPATIENT)
Dept: MAMMOGRAPHY | Facility: HOSPITAL | Age: 75
Discharge: HOME | End: 2025-06-30
Admitting: INTERNAL MEDICINE
Payer: MEDICARE

## 2025-06-30 DIAGNOSIS — Z12.31 VISIT FOR SCREENING MAMMOGRAM: ICD-10-CM

## 2025-06-30 PROCEDURE — 77063 BREAST TOMOSYNTHESIS BI: CPT

## 2025-06-30 PROCEDURE — 77067 SCR MAMMO BI INCL CAD: CPT

## (undated) DEVICE — MEDI-VAC NON-CONDUCTIVE SUCTION TUBING: Brand: CARDINAL HEALTH

## (undated) DEVICE — SKIN AFFIX SURG ADHESIVE 72/CS 0.55ML: Brand: MEDLINE

## (undated) DEVICE — SUT MNCRYL PLS ANTIB UD 4/0 PS2 18IN

## (undated) DEVICE — ELECTRD BLD EDGE/INSUL1P 2.4X5.1MM STRL

## (undated) DEVICE — GLV SURG BIOGEL LTX PF 7 1/2

## (undated) DEVICE — ATT EVAC SMOKE ACCUVAC

## (undated) DEVICE — PK CHST BRST 10

## (undated) DEVICE — DRSNG GZ CURAD XEROFORM NONADHR OVERWRAP 5X9IN

## (undated) DEVICE — ANTIBACTERIAL UNDYED BRAIDED (POLYGLACTIN 910), SYNTHETIC ABSORBABLE SUTURE: Brand: COATED VICRYL

## (undated) DEVICE — SUT ETHLN 3/0 PC5 18IN 1893G

## (undated) DEVICE — MEDI-VAC YANKAUER SUCTION HANDLE W/BULBOUS TIP: Brand: CARDINAL HEALTH

## (undated) DEVICE — DRSNG TELFA PAD NONADH STR 1S 3X8IN

## (undated) DEVICE — BANDAGE,GAUZE,BULKEE II,4.5"X4.1YD,STRL: Brand: MEDLINE

## (undated) DEVICE — 3M™ BAIR HUGGER® UNDERBODY BLANKET, ADULT, 10 PER CASE 54500: Brand: BAIR HUGGER™

## (undated) DEVICE — BRA COMPR SURG STL958 LG

## (undated) DEVICE — GLV SURG SENSICARE W/ALOE PF LF SZ6 STRL

## (undated) DEVICE — SPNG GZ WOVN 4X4IN 12PLY 10/BX STRL

## (undated) DEVICE — CVR HNDL LIGHT RIGID

## (undated) DEVICE — SUT MONOCRYL PLS ANTIB UND 3/0  PS1 27IN

## (undated) DEVICE — AIRWY 90MM NO9

## (undated) DEVICE — SUT MNCRYL PLS ANTIB UD 3/0 PS2 27IN